# Patient Record
Sex: FEMALE | Race: WHITE | Employment: UNEMPLOYED | URBAN - METROPOLITAN AREA
[De-identification: names, ages, dates, MRNs, and addresses within clinical notes are randomized per-mention and may not be internally consistent; named-entity substitution may affect disease eponyms.]

---

## 2017-01-30 RX ORDER — SPIRONOLACTONE 100 MG/1
TABLET, FILM COATED ORAL
Qty: 90 TABLET | Refills: 1 | Status: SHIPPED | OUTPATIENT
Start: 2017-01-30 | End: 2017-12-13 | Stop reason: SDUPTHER

## 2018-01-26 ENCOUNTER — OFFICE VISIT (OUTPATIENT)
Dept: FAMILY MEDICINE CLINIC | Age: 43
End: 2018-01-26

## 2018-01-26 VITALS
DIASTOLIC BLOOD PRESSURE: 76 MMHG | BODY MASS INDEX: 21.89 KG/M2 | RESPIRATION RATE: 14 BRPM | HEART RATE: 64 BPM | SYSTOLIC BLOOD PRESSURE: 129 MMHG | TEMPERATURE: 96.9 F | HEIGHT: 65 IN | WEIGHT: 131.4 LBS | OXYGEN SATURATION: 98 %

## 2018-01-26 DIAGNOSIS — N93.8 DUB (DYSFUNCTIONAL UTERINE BLEEDING): ICD-10-CM

## 2018-01-26 DIAGNOSIS — R74.8 ELEVATED LIVER ENZYMES: ICD-10-CM

## 2018-01-26 DIAGNOSIS — Z00.00 WELL ADULT EXAM: Primary | ICD-10-CM

## 2018-01-26 PROCEDURE — 99396 PREV VISIT EST AGE 40-64: CPT | Performed by: FAMILY MEDICINE

## 2018-01-26 RX ORDER — NORETHINDRONE ACETATE AND ETHINYL ESTRADIOL 1.5-30(21)
1 KIT ORAL DAILY
Qty: 1 PACKET | Refills: 12 | Status: SHIPPED | OUTPATIENT
Start: 2018-01-26 | End: 2018-02-15 | Stop reason: SDUPTHER

## 2018-01-26 RX ORDER — NAPROXEN 500 MG/1
TABLET ORAL
Refills: 0 | COMMUNITY
Start: 2017-12-12 | End: 2018-02-22

## 2018-01-26 RX ORDER — FLUTICASONE PROPIONATE 110 UG/1
AEROSOL, METERED RESPIRATORY (INHALATION)
Status: ON HOLD | COMMUNITY
End: 2019-11-01 | Stop reason: CLARIF

## 2018-01-26 RX ORDER — SPIRONOLACTONE 100 MG/1
TABLET, FILM COATED ORAL
Qty: 90 TABLET | Refills: 3 | Status: SHIPPED | OUTPATIENT
Start: 2018-01-26 | End: 2019-01-16 | Stop reason: SDUPTHER

## 2018-01-26 ASSESSMENT — PATIENT HEALTH QUESTIONNAIRE - PHQ9
SUM OF ALL RESPONSES TO PHQ9 QUESTIONS 1 & 2: 0
2. FEELING DOWN, DEPRESSED OR HOPELESS: 0
1. LITTLE INTEREST OR PLEASURE IN DOING THINGS: 0
SUM OF ALL RESPONSES TO PHQ QUESTIONS 1-9: 0

## 2018-01-26 ASSESSMENT — ENCOUNTER SYMPTOMS
WHEEZING: 0
COUGH: 0
SHORTNESS OF BREATH: 0

## 2018-01-26 NOTE — PROGRESS NOTES
Subjective:      Patient ID: Nona Friend is a 38 y.o. female. NELIA Prado is here for her annual wellness exam.    Liver tests were mildly elevated with labs. She drinks alcohol rarely. Does not take Tylenol. Was taking Naproxen when labs were done. 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 Diet: eats well  Exercise: runs, weights. Sleeps well most nights. PAP normal per Dr. Gonzalez Labor Date Due    HIV screen  08/04/1988    Pneumococcal med risk (1 of 1 - PPSV23) 08/04/1992    Flu vaccine (1) 09/01/2017    Potassium monitoring  01/12/2019    Creatinine monitoring  01/12/2019    Cervical cancer screen  09/10/2020    Lipid screen  01/12/2023    DTaP/Tdap/Td vaccine (2 - Td) 08/06/2024     Social History   Substance Use Topics    Smoking status: Never Smoker    Smokeless tobacco: Never Used    Alcohol use Not on file        Family History   Problem Relation Age of Onset    Hypertension Mother     Hypertension Father     Coronary Art Dis Maternal Grandmother 72    Coronary Art Dis Maternal Uncle       Outpatient Prescriptions Marked as Taking for the 1/26/18 encounter (Office Visit) with Garett Elmore MD   Medication Sig Dispense Refill    fluticasone (FLOVENT HFA) 110 MCG/ACT inhaler Inhale into the lungs      spironolactone (ALDACTONE) 100 MG tablet TAKE 1 TABLET DAILY 30 tablet 0    fluticasone-salmeterol (ADVAIR DISKUS) 250-50 MCG/DOSE AEPB Inhale 1 puff into the lungs every 12 hours.  3 Inhaler 3        Allergies   Allergen Reactions    Dexamethasone Acetate Other (See Comments)     Makes pt have woozy head spells and HAs    Venlafaxine Other (See Comments)     Fatigue    Zoloft Other (See Comments)      Patient Active Problem List   Diagnosis    Acne    Asthma    Allergic rhinitis    PMDD (premenstrual dysphoric disorder)    Acne rosacea    b  Past Medical History:   Diagnosis Date    Asthma      Past

## 2018-02-15 DIAGNOSIS — N93.8 DUB (DYSFUNCTIONAL UTERINE BLEEDING): ICD-10-CM

## 2018-02-15 RX ORDER — NORETHINDRONE ACETATE AND ETHINYL ESTRADIOL 1.5-30(21)
1 KIT ORAL DAILY
Qty: 1 PACKET | Refills: 12 | Status: SHIPPED | OUTPATIENT
Start: 2018-02-15 | End: 2018-02-15 | Stop reason: SDUPTHER

## 2018-02-15 RX ORDER — NORETHINDRONE ACETATE AND ETHINYL ESTRADIOL 1.5-30(21)
1 KIT ORAL DAILY
Qty: 3 PACKET | Refills: 3 | Status: SHIPPED | OUTPATIENT
Start: 2018-02-15 | End: 2019-07-18 | Stop reason: SDUPTHER

## 2018-02-22 ENCOUNTER — OFFICE VISIT (OUTPATIENT)
Dept: FAMILY MEDICINE CLINIC | Age: 43
End: 2018-02-22

## 2018-02-22 VITALS
TEMPERATURE: 97.4 F | BODY MASS INDEX: 21.13 KG/M2 | SYSTOLIC BLOOD PRESSURE: 110 MMHG | OXYGEN SATURATION: 98 % | WEIGHT: 127 LBS | RESPIRATION RATE: 14 BRPM | DIASTOLIC BLOOD PRESSURE: 84 MMHG | HEART RATE: 67 BPM

## 2018-02-22 DIAGNOSIS — J20.9 BRONCHITIS WITH BRONCHOSPASM: Primary | ICD-10-CM

## 2018-02-22 PROCEDURE — 99213 OFFICE O/P EST LOW 20 MIN: CPT | Performed by: FAMILY MEDICINE

## 2018-02-22 RX ORDER — AZITHROMYCIN 250 MG/1
TABLET, FILM COATED ORAL
Qty: 1 PACKET | Refills: 0 | Status: SHIPPED | OUTPATIENT
Start: 2018-02-22 | End: 2018-03-04

## 2018-02-22 RX ORDER — AZITHROMYCIN 250 MG/1
TABLET, FILM COATED ORAL
Qty: 1 PACKET | Refills: 0 | Status: SHIPPED | OUTPATIENT
Start: 2018-02-22 | End: 2018-02-22 | Stop reason: SDUPTHER

## 2018-02-25 ENCOUNTER — PATIENT MESSAGE (OUTPATIENT)
Dept: FAMILY MEDICINE CLINIC | Age: 43
End: 2018-02-25

## 2018-02-26 RX ORDER — FLUCONAZOLE 150 MG/1
150 TABLET ORAL ONCE
Qty: 1 TABLET | Refills: 0 | Status: SHIPPED | OUTPATIENT
Start: 2018-02-26 | End: 2018-02-26

## 2018-03-02 ENCOUNTER — PATIENT MESSAGE (OUTPATIENT)
Dept: FAMILY MEDICINE CLINIC | Age: 43
End: 2018-03-02

## 2018-06-08 ENCOUNTER — NURSE ONLY (OUTPATIENT)
Dept: FAMILY MEDICINE CLINIC | Age: 43
End: 2018-06-08

## 2018-06-08 DIAGNOSIS — Z23 NEED FOR HEPATITIS A IMMUNIZATION: Primary | ICD-10-CM

## 2018-06-08 PROCEDURE — 90471 IMMUNIZATION ADMIN: CPT | Performed by: FAMILY MEDICINE

## 2018-06-08 PROCEDURE — 90632 HEPA VACCINE ADULT IM: CPT | Performed by: FAMILY MEDICINE

## 2019-01-16 ENCOUNTER — TELEPHONE (OUTPATIENT)
Dept: FAMILY MEDICINE CLINIC | Age: 44
End: 2019-01-16

## 2019-01-16 DIAGNOSIS — Z00.00 WELL ADULT EXAM: ICD-10-CM

## 2019-01-16 DIAGNOSIS — L71.9 ACNE ROSACEA: Primary | ICD-10-CM

## 2019-01-16 RX ORDER — SPIRONOLACTONE 100 MG/1
TABLET, FILM COATED ORAL
Qty: 90 TABLET | Refills: 0 | Status: SHIPPED | OUTPATIENT
Start: 2019-01-16 | End: 2019-02-11 | Stop reason: SDUPTHER

## 2019-02-05 DIAGNOSIS — Z00.00 WELL ADULT EXAM: ICD-10-CM

## 2019-02-05 LAB
A/G RATIO: 1.8 (ref 1.1–2.2)
ALBUMIN SERPL-MCNC: 4.2 G/DL (ref 3.4–5)
ALP BLD-CCNC: 55 U/L (ref 40–129)
ALT SERPL-CCNC: 16 U/L (ref 10–40)
ANION GAP SERPL CALCULATED.3IONS-SCNC: 13 MMOL/L (ref 3–16)
AST SERPL-CCNC: 22 U/L (ref 15–37)
BILIRUB SERPL-MCNC: 0.6 MG/DL (ref 0–1)
BUN BLDV-MCNC: 10 MG/DL (ref 7–20)
CALCIUM SERPL-MCNC: 9.4 MG/DL (ref 8.3–10.6)
CHLORIDE BLD-SCNC: 103 MMOL/L (ref 99–110)
CHOLESTEROL, TOTAL: 157 MG/DL (ref 0–199)
CO2: 25 MMOL/L (ref 21–32)
CREAT SERPL-MCNC: 0.6 MG/DL (ref 0.6–1.1)
GFR AFRICAN AMERICAN: >60
GFR NON-AFRICAN AMERICAN: >60
GLOBULIN: 2.3 G/DL
GLUCOSE BLD-MCNC: 93 MG/DL (ref 70–99)
HDLC SERPL-MCNC: 67 MG/DL (ref 40–60)
LDL CHOLESTEROL CALCULATED: 80 MG/DL
POTASSIUM SERPL-SCNC: 4.1 MMOL/L (ref 3.5–5.1)
SODIUM BLD-SCNC: 141 MMOL/L (ref 136–145)
TOTAL PROTEIN: 6.5 G/DL (ref 6.4–8.2)
TRIGL SERPL-MCNC: 50 MG/DL (ref 0–150)
TSH REFLEX: 1.15 UIU/ML (ref 0.27–4.2)
VLDLC SERPL CALC-MCNC: 10 MG/DL

## 2019-02-11 ENCOUNTER — OFFICE VISIT (OUTPATIENT)
Dept: FAMILY MEDICINE CLINIC | Age: 44
End: 2019-02-11
Payer: COMMERCIAL

## 2019-02-11 VITALS
HEIGHT: 64 IN | BODY MASS INDEX: 21.89 KG/M2 | SYSTOLIC BLOOD PRESSURE: 95 MMHG | DIASTOLIC BLOOD PRESSURE: 56 MMHG | OXYGEN SATURATION: 99 % | HEART RATE: 60 BPM | TEMPERATURE: 96.9 F | RESPIRATION RATE: 16 BRPM | WEIGHT: 128.2 LBS

## 2019-02-11 DIAGNOSIS — L82.1 SEBORRHEIC KERATOSES: ICD-10-CM

## 2019-02-11 DIAGNOSIS — J45.30 MILD PERSISTENT ASTHMA WITHOUT COMPLICATION: ICD-10-CM

## 2019-02-11 DIAGNOSIS — Z00.00 WELL ADULT EXAM: Primary | ICD-10-CM

## 2019-02-11 DIAGNOSIS — Z23 NEED FOR PNEUMOCOCCAL VACCINATION: ICD-10-CM

## 2019-02-11 DIAGNOSIS — L71.9 ACNE ROSACEA: ICD-10-CM

## 2019-02-11 DIAGNOSIS — Z23 NEED FOR HEPATITIS A VACCINATION: ICD-10-CM

## 2019-02-11 PROCEDURE — 90732 PPSV23 VACC 2 YRS+ SUBQ/IM: CPT | Performed by: FAMILY MEDICINE

## 2019-02-11 PROCEDURE — 99396 PREV VISIT EST AGE 40-64: CPT | Performed by: FAMILY MEDICINE

## 2019-02-11 PROCEDURE — 90632 HEPA VACCINE ADULT IM: CPT | Performed by: FAMILY MEDICINE

## 2019-02-11 PROCEDURE — 90471 IMMUNIZATION ADMIN: CPT | Performed by: FAMILY MEDICINE

## 2019-02-11 PROCEDURE — 90472 IMMUNIZATION ADMIN EACH ADD: CPT | Performed by: FAMILY MEDICINE

## 2019-02-11 RX ORDER — SPIRONOLACTONE 100 MG/1
TABLET, FILM COATED ORAL
Qty: 90 TABLET | Refills: 3 | Status: ON HOLD | OUTPATIENT
Start: 2019-02-11 | End: 2019-11-01 | Stop reason: ALTCHOICE

## 2019-02-11 ASSESSMENT — ENCOUNTER SYMPTOMS
DIARRHEA: 0
SORE THROAT: 0
ABDOMINAL PAIN: 0
BLOOD IN STOOL: 0
CHOKING: 0
CHEST TIGHTNESS: 0
VOICE CHANGE: 0
ANAL BLEEDING: 0
TROUBLE SWALLOWING: 0
NAUSEA: 0
WHEEZING: 0
SHORTNESS OF BREATH: 0
CONSTIPATION: 0

## 2019-09-09 ENCOUNTER — HOSPITAL ENCOUNTER (OUTPATIENT)
Dept: GENERAL RADIOLOGY | Age: 44
Discharge: HOME | End: 2019-09-09
Payer: COMMERCIAL

## 2019-09-09 DIAGNOSIS — R13.10 PROBLEMS WITH SWALLOWING AND MASTICATION: ICD-10-CM

## 2019-09-09 PROCEDURE — 74220 X-RAY XM ESOPHAGUS 1CNTRST: CPT

## 2019-11-01 ENCOUNTER — ANESTHESIA (OUTPATIENT)
Dept: ENDOSCOPY | Age: 44
End: 2019-11-01
Payer: COMMERCIAL

## 2019-11-01 ENCOUNTER — APPOINTMENT (OUTPATIENT)
Dept: GENERAL RADIOLOGY | Age: 44
End: 2019-11-01
Attending: INTERNAL MEDICINE
Payer: COMMERCIAL

## 2019-11-01 ENCOUNTER — HOSPITAL ENCOUNTER (OUTPATIENT)
Age: 44
Setting detail: OUTPATIENT SURGERY
Discharge: HOME | End: 2019-11-01
Attending: INTERNAL MEDICINE | Admitting: INTERNAL MEDICINE
Payer: COMMERCIAL

## 2019-11-01 ENCOUNTER — ANESTHESIA EVENT (OUTPATIENT)
Dept: ENDOSCOPY | Age: 44
End: 2019-11-01
Payer: COMMERCIAL

## 2019-11-01 VITALS
WEIGHT: 130 LBS | RESPIRATION RATE: 15 BRPM | SYSTOLIC BLOOD PRESSURE: 114 MMHG | HEIGHT: 65 IN | DIASTOLIC BLOOD PRESSURE: 72 MMHG | OXYGEN SATURATION: 100 % | HEART RATE: 52 BPM | TEMPERATURE: 98.1 F | BODY MASS INDEX: 21.66 KG/M2

## 2019-11-01 VITALS — SYSTOLIC BLOOD PRESSURE: 139 MMHG | OXYGEN SATURATION: 99 % | DIASTOLIC BLOOD PRESSURE: 75 MMHG

## 2019-11-01 LAB — PREGNANCY, URINE: NEGATIVE

## 2019-11-01 PROCEDURE — 3609012400 HC EGD TRANSORAL BIOPSY SINGLE/MULTIPLE: Performed by: INTERNAL MEDICINE

## 2019-11-01 PROCEDURE — 7100000011 HC PHASE II RECOVERY - ADDTL 15 MIN: Performed by: INTERNAL MEDICINE

## 2019-11-01 PROCEDURE — 7100000010 HC PHASE II RECOVERY - FIRST 15 MIN: Performed by: INTERNAL MEDICINE

## 2019-11-01 PROCEDURE — 2709999900 HC NON-CHARGEABLE SUPPLY: Performed by: INTERNAL MEDICINE

## 2019-11-01 PROCEDURE — 3609017700 HC EGD DILATION GASTRIC/DUODENAL STRICTURE: Performed by: INTERNAL MEDICINE

## 2019-11-01 PROCEDURE — 2500000003 HC RX 250 WO HCPCS: Performed by: STUDENT IN AN ORGANIZED HEALTH CARE EDUCATION/TRAINING PROGRAM

## 2019-11-01 PROCEDURE — 71045 X-RAY EXAM CHEST 1 VIEW: CPT

## 2019-11-01 PROCEDURE — 3700000000 HC ANESTHESIA ATTENDED CARE: Performed by: INTERNAL MEDICINE

## 2019-11-01 PROCEDURE — 2580000003 HC RX 258: Performed by: STUDENT IN AN ORGANIZED HEALTH CARE EDUCATION/TRAINING PROGRAM

## 2019-11-01 PROCEDURE — 3700000001 HC ADD 15 MINUTES (ANESTHESIA): Performed by: INTERNAL MEDICINE

## 2019-11-01 PROCEDURE — 6360000002 HC RX W HCPCS: Performed by: STUDENT IN AN ORGANIZED HEALTH CARE EDUCATION/TRAINING PROGRAM

## 2019-11-01 PROCEDURE — 84703 CHORIONIC GONADOTROPIN ASSAY: CPT

## 2019-11-01 PROCEDURE — 88305 TISSUE EXAM BY PATHOLOGIST: CPT

## 2019-11-01 PROCEDURE — 3209999900 FLUORO FOR SURGICAL PROCEDURES

## 2019-11-01 RX ORDER — LIDOCAINE HYDROCHLORIDE 20 MG/ML
INJECTION, SOLUTION INFILTRATION; PERINEURAL PRN
Status: DISCONTINUED | OUTPATIENT
Start: 2019-11-01 | End: 2019-11-01 | Stop reason: SDUPTHER

## 2019-11-01 RX ORDER — SODIUM CHLORIDE, SODIUM LACTATE, POTASSIUM CHLORIDE, CALCIUM CHLORIDE 600; 310; 30; 20 MG/100ML; MG/100ML; MG/100ML; MG/100ML
INJECTION, SOLUTION INTRAVENOUS CONTINUOUS PRN
Status: DISCONTINUED | OUTPATIENT
Start: 2019-11-01 | End: 2019-11-01 | Stop reason: SDUPTHER

## 2019-11-01 RX ORDER — PROPOFOL 10 MG/ML
INJECTION, EMULSION INTRAVENOUS PRN
Status: DISCONTINUED | OUTPATIENT
Start: 2019-11-01 | End: 2019-11-01 | Stop reason: SDUPTHER

## 2019-11-01 RX ADMIN — SODIUM CHLORIDE, SODIUM LACTATE, POTASSIUM CHLORIDE, AND CALCIUM CHLORIDE: 600; 310; 30; 20 INJECTION, SOLUTION INTRAVENOUS at 11:02

## 2019-11-01 RX ADMIN — PROPOFOL 100 MG: 10 INJECTION, EMULSION INTRAVENOUS at 11:30

## 2019-11-01 RX ADMIN — PROPOFOL 50 MG: 10 INJECTION, EMULSION INTRAVENOUS at 11:37

## 2019-11-01 RX ADMIN — PROPOFOL 100 MG: 10 INJECTION, EMULSION INTRAVENOUS at 11:35

## 2019-11-01 RX ADMIN — PROPOFOL 50 MG: 10 INJECTION, EMULSION INTRAVENOUS at 11:25

## 2019-11-01 RX ADMIN — PROPOFOL 100 MG: 10 INJECTION, EMULSION INTRAVENOUS at 11:32

## 2019-11-01 RX ADMIN — PROPOFOL 100 MG: 10 INJECTION, EMULSION INTRAVENOUS at 11:23

## 2019-11-01 RX ADMIN — PROPOFOL 50 MG: 10 INJECTION, EMULSION INTRAVENOUS at 11:39

## 2019-11-01 RX ADMIN — LIDOCAINE HYDROCHLORIDE 100 MG: 20 INJECTION, SOLUTION INFILTRATION; PERINEURAL at 11:22

## 2019-11-01 RX ADMIN — PROPOFOL 50 MG: 10 INJECTION, EMULSION INTRAVENOUS at 11:27

## 2019-11-01 ASSESSMENT — PAIN SCALES - WONG BAKER
WONGBAKER_NUMERICALRESPONSE: 0

## 2019-11-01 ASSESSMENT — PULMONARY FUNCTION TESTS
PIF_VALUE: 0

## 2019-11-01 ASSESSMENT — PAIN - FUNCTIONAL ASSESSMENT: PAIN_FUNCTIONAL_ASSESSMENT: 0-10

## 2019-11-01 ASSESSMENT — PAIN SCALES - GENERAL: PAINLEVEL_OUTOF10: 5

## 2019-11-09 ENCOUNTER — HOSPITAL ENCOUNTER (OUTPATIENT)
Dept: CT IMAGING | Age: 44
Discharge: HOME | End: 2019-11-09
Payer: COMMERCIAL

## 2019-11-09 DIAGNOSIS — M54.2 NECK PAIN: ICD-10-CM

## 2019-11-09 DIAGNOSIS — M54.2 NECK PAIN: Primary | ICD-10-CM

## 2019-11-09 PROCEDURE — 6360000004 HC RX CONTRAST MEDICATION: Performed by: INTERNAL MEDICINE

## 2019-11-09 PROCEDURE — 70490 CT SOFT TISSUE NECK W/O DYE: CPT

## 2019-11-09 PROCEDURE — 71275 CT ANGIOGRAPHY CHEST: CPT

## 2019-11-09 RX ADMIN — IOPAMIDOL 80 ML: 755 INJECTION, SOLUTION INTRAVENOUS at 11:49

## 2019-12-05 ENCOUNTER — ANESTHESIA EVENT (OUTPATIENT)
Dept: ENDOSCOPY | Age: 44
End: 2019-12-05
Payer: COMMERCIAL

## 2019-12-05 RX ORDER — SODIUM CHLORIDE, SODIUM LACTATE, POTASSIUM CHLORIDE, CALCIUM CHLORIDE 600; 310; 30; 20 MG/100ML; MG/100ML; MG/100ML; MG/100ML
INJECTION, SOLUTION INTRAVENOUS CONTINUOUS
Status: CANCELLED | OUTPATIENT
Start: 2019-12-05

## 2019-12-06 ENCOUNTER — APPOINTMENT (OUTPATIENT)
Dept: GENERAL RADIOLOGY | Age: 44
End: 2019-12-06
Attending: INTERNAL MEDICINE
Payer: COMMERCIAL

## 2019-12-06 ENCOUNTER — HOSPITAL ENCOUNTER (OUTPATIENT)
Age: 44
Setting detail: OUTPATIENT SURGERY
Discharge: HOME | End: 2019-12-06
Attending: INTERNAL MEDICINE | Admitting: INTERNAL MEDICINE
Payer: COMMERCIAL

## 2019-12-06 ENCOUNTER — ANESTHESIA (OUTPATIENT)
Dept: ENDOSCOPY | Age: 44
End: 2019-12-06
Payer: COMMERCIAL

## 2019-12-06 VITALS
WEIGHT: 130 LBS | OXYGEN SATURATION: 98 % | DIASTOLIC BLOOD PRESSURE: 70 MMHG | HEIGHT: 65 IN | TEMPERATURE: 97.2 F | BODY MASS INDEX: 21.66 KG/M2 | RESPIRATION RATE: 16 BRPM | SYSTOLIC BLOOD PRESSURE: 126 MMHG | HEART RATE: 70 BPM

## 2019-12-06 VITALS — SYSTOLIC BLOOD PRESSURE: 158 MMHG | DIASTOLIC BLOOD PRESSURE: 94 MMHG | OXYGEN SATURATION: 98 %

## 2019-12-06 LAB — PREGNANCY, URINE: NEGATIVE

## 2019-12-06 PROCEDURE — 2500000003 HC RX 250 WO HCPCS: Performed by: STUDENT IN AN ORGANIZED HEALTH CARE EDUCATION/TRAINING PROGRAM

## 2019-12-06 PROCEDURE — 7100000010 HC PHASE II RECOVERY - FIRST 15 MIN: Performed by: INTERNAL MEDICINE

## 2019-12-06 PROCEDURE — 84703 CHORIONIC GONADOTROPIN ASSAY: CPT

## 2019-12-06 PROCEDURE — 3700000000 HC ANESTHESIA ATTENDED CARE: Performed by: INTERNAL MEDICINE

## 2019-12-06 PROCEDURE — 3609017700 HC EGD DILATION GASTRIC/DUODENAL STRICTURE: Performed by: INTERNAL MEDICINE

## 2019-12-06 PROCEDURE — 71045 X-RAY EXAM CHEST 1 VIEW: CPT

## 2019-12-06 PROCEDURE — 88305 TISSUE EXAM BY PATHOLOGIST: CPT

## 2019-12-06 PROCEDURE — 3209999900 FLUORO FOR SURGICAL PROCEDURES

## 2019-12-06 PROCEDURE — 2580000003 HC RX 258: Performed by: STUDENT IN AN ORGANIZED HEALTH CARE EDUCATION/TRAINING PROGRAM

## 2019-12-06 PROCEDURE — 3700000001 HC ADD 15 MINUTES (ANESTHESIA): Performed by: INTERNAL MEDICINE

## 2019-12-06 PROCEDURE — 6360000002 HC RX W HCPCS: Performed by: STUDENT IN AN ORGANIZED HEALTH CARE EDUCATION/TRAINING PROGRAM

## 2019-12-06 PROCEDURE — 7100000011 HC PHASE II RECOVERY - ADDTL 15 MIN: Performed by: INTERNAL MEDICINE

## 2019-12-06 PROCEDURE — 2709999900 HC NON-CHARGEABLE SUPPLY: Performed by: INTERNAL MEDICINE

## 2019-12-06 PROCEDURE — C1726 CATH, BAL DIL, NON-VASCULAR: HCPCS | Performed by: INTERNAL MEDICINE

## 2019-12-06 PROCEDURE — 3609012400 HC EGD TRANSORAL BIOPSY SINGLE/MULTIPLE: Performed by: INTERNAL MEDICINE

## 2019-12-06 RX ORDER — MIDAZOLAM HYDROCHLORIDE 1 MG/ML
INJECTION INTRAMUSCULAR; INTRAVENOUS PRN
Status: DISCONTINUED | OUTPATIENT
Start: 2019-12-06 | End: 2019-12-06 | Stop reason: SDUPTHER

## 2019-12-06 RX ORDER — HYDRALAZINE HYDROCHLORIDE 20 MG/ML
INJECTION INTRAMUSCULAR; INTRAVENOUS PRN
Status: DISCONTINUED | OUTPATIENT
Start: 2019-12-06 | End: 2019-12-06 | Stop reason: SDUPTHER

## 2019-12-06 RX ORDER — SODIUM CHLORIDE, SODIUM LACTATE, POTASSIUM CHLORIDE, CALCIUM CHLORIDE 600; 310; 30; 20 MG/100ML; MG/100ML; MG/100ML; MG/100ML
INJECTION, SOLUTION INTRAVENOUS CONTINUOUS PRN
Status: DISCONTINUED | OUTPATIENT
Start: 2019-12-06 | End: 2019-12-06 | Stop reason: SDUPTHER

## 2019-12-06 RX ORDER — PROPOFOL 10 MG/ML
INJECTION, EMULSION INTRAVENOUS PRN
Status: DISCONTINUED | OUTPATIENT
Start: 2019-12-06 | End: 2019-12-06 | Stop reason: SDUPTHER

## 2019-12-06 RX ORDER — FENTANYL CITRATE 50 UG/ML
INJECTION, SOLUTION INTRAMUSCULAR; INTRAVENOUS PRN
Status: DISCONTINUED | OUTPATIENT
Start: 2019-12-06 | End: 2019-12-06 | Stop reason: SDUPTHER

## 2019-12-06 RX ORDER — LIDOCAINE HYDROCHLORIDE 20 MG/ML
INJECTION, SOLUTION INFILTRATION; PERINEURAL PRN
Status: DISCONTINUED | OUTPATIENT
Start: 2019-12-06 | End: 2019-12-06 | Stop reason: SDUPTHER

## 2019-12-06 RX ADMIN — SODIUM CHLORIDE, SODIUM LACTATE, POTASSIUM CHLORIDE, AND CALCIUM CHLORIDE: 600; 310; 30; 20 INJECTION, SOLUTION INTRAVENOUS at 12:25

## 2019-12-06 RX ADMIN — HYDRALAZINE HYDROCHLORIDE 10 MG: 20 INJECTION INTRAMUSCULAR; INTRAVENOUS at 12:43

## 2019-12-06 RX ADMIN — PROPOFOL 50 MG: 10 INJECTION, EMULSION INTRAVENOUS at 12:56

## 2019-12-06 RX ADMIN — PROPOFOL 50 MG: 10 INJECTION, EMULSION INTRAVENOUS at 12:50

## 2019-12-06 RX ADMIN — LIDOCAINE HYDROCHLORIDE 50 MG: 20 INJECTION, SOLUTION INFILTRATION; PERINEURAL at 12:42

## 2019-12-06 RX ADMIN — PROPOFOL 50 MG: 10 INJECTION, EMULSION INTRAVENOUS at 13:02

## 2019-12-06 RX ADMIN — PROPOFOL 50 MG: 10 INJECTION, EMULSION INTRAVENOUS at 13:04

## 2019-12-06 RX ADMIN — FENTANYL CITRATE 100 MCG: 50 INJECTION INTRAMUSCULAR; INTRAVENOUS at 12:36

## 2019-12-06 RX ADMIN — MIDAZOLAM HYDROCHLORIDE 2 MG: 2 INJECTION, SOLUTION INTRAMUSCULAR; INTRAVENOUS at 12:41

## 2019-12-06 RX ADMIN — PROPOFOL 100 MG: 10 INJECTION, EMULSION INTRAVENOUS at 12:45

## 2019-12-06 RX ADMIN — PROPOFOL 50 MG: 10 INJECTION, EMULSION INTRAVENOUS at 12:54

## 2019-12-06 RX ADMIN — PROPOFOL 50 MG: 10 INJECTION, EMULSION INTRAVENOUS at 12:58

## 2019-12-06 RX ADMIN — PROPOFOL 50 MG: 10 INJECTION, EMULSION INTRAVENOUS at 12:52

## 2019-12-06 RX ADMIN — PROPOFOL 50 MG: 10 INJECTION, EMULSION INTRAVENOUS at 12:48

## 2019-12-06 RX ADMIN — PROPOFOL 50 MG: 10 INJECTION, EMULSION INTRAVENOUS at 13:06

## 2019-12-06 RX ADMIN — PROPOFOL 50 MG: 10 INJECTION, EMULSION INTRAVENOUS at 13:00

## 2019-12-06 ASSESSMENT — PULMONARY FUNCTION TESTS
PIF_VALUE: 0

## 2019-12-06 ASSESSMENT — PAIN DESCRIPTION - DESCRIPTORS: DESCRIPTORS: ACHING

## 2019-12-06 ASSESSMENT — PAIN SCALES - WONG BAKER
WONGBAKER_NUMERICALRESPONSE: 2
WONGBAKER_NUMERICALRESPONSE: 0

## 2019-12-06 ASSESSMENT — PAIN SCALES - GENERAL: PAINLEVEL_OUTOF10: 4

## 2019-12-06 ASSESSMENT — PAIN DESCRIPTION - LOCATION
LOCATION: THROAT
LOCATION: THROAT

## 2019-12-06 ASSESSMENT — PAIN DESCRIPTION - PAIN TYPE: TYPE: ACUTE PAIN

## 2019-12-09 PROBLEM — K20.0 EOSINOPHILIC ESOPHAGITIS: Status: ACTIVE | Noted: 2019-12-09

## 2019-12-16 PROBLEM — Q39.4 ESOPHAGEAL WEB: Status: ACTIVE | Noted: 2019-12-16

## 2020-01-29 ENCOUNTER — PATIENT MESSAGE (OUTPATIENT)
Dept: FAMILY MEDICINE CLINIC | Age: 45
End: 2020-01-29

## 2020-01-30 DIAGNOSIS — Z00.00 WELL ADULT EXAM: ICD-10-CM

## 2020-01-30 LAB
A/G RATIO: 1.8 (ref 1.1–2.2)
ALBUMIN SERPL-MCNC: 4.2 G/DL (ref 3.4–5)
ALP BLD-CCNC: 55 U/L (ref 40–129)
ALT SERPL-CCNC: 24 U/L (ref 10–40)
ANION GAP SERPL CALCULATED.3IONS-SCNC: 12 MMOL/L (ref 3–16)
AST SERPL-CCNC: 28 U/L (ref 15–37)
BASOPHILS ABSOLUTE: 0 K/UL (ref 0–0.2)
BASOPHILS RELATIVE PERCENT: 1.3 %
BILIRUB SERPL-MCNC: 0.5 MG/DL (ref 0–1)
BUN BLDV-MCNC: 9 MG/DL (ref 7–20)
CALCIUM SERPL-MCNC: 9.3 MG/DL (ref 8.3–10.6)
CHLORIDE BLD-SCNC: 105 MMOL/L (ref 99–110)
CHOLESTEROL, TOTAL: 183 MG/DL (ref 0–199)
CO2: 26 MMOL/L (ref 21–32)
CREAT SERPL-MCNC: 0.6 MG/DL (ref 0.6–1.1)
EOSINOPHILS ABSOLUTE: 0.3 K/UL (ref 0–0.6)
EOSINOPHILS RELATIVE PERCENT: 7.2 %
GFR AFRICAN AMERICAN: >60
GFR NON-AFRICAN AMERICAN: >60
GLOBULIN: 2.4 G/DL
GLUCOSE BLD-MCNC: 96 MG/DL (ref 70–99)
HCT VFR BLD CALC: 40.5 % (ref 36–48)
HDLC SERPL-MCNC: 82 MG/DL (ref 40–60)
HEMOGLOBIN: 13.5 G/DL (ref 12–16)
LDL CHOLESTEROL CALCULATED: 91 MG/DL
LYMPHOCYTES ABSOLUTE: 0.8 K/UL (ref 1–5.1)
LYMPHOCYTES RELATIVE PERCENT: 21.4 %
MCH RBC QN AUTO: 30.1 PG (ref 26–34)
MCHC RBC AUTO-ENTMCNC: 33.3 G/DL (ref 31–36)
MCV RBC AUTO: 90.2 FL (ref 80–100)
MONOCYTES ABSOLUTE: 0.5 K/UL (ref 0–1.3)
MONOCYTES RELATIVE PERCENT: 12.7 %
NEUTROPHILS ABSOLUTE: 2.2 K/UL (ref 1.7–7.7)
NEUTROPHILS RELATIVE PERCENT: 57.4 %
PDW BLD-RTO: 13.3 % (ref 12.4–15.4)
PLATELET # BLD: 217 K/UL (ref 135–450)
PMV BLD AUTO: 8.1 FL (ref 5–10.5)
POTASSIUM SERPL-SCNC: 4.3 MMOL/L (ref 3.5–5.1)
RBC # BLD: 4.49 M/UL (ref 4–5.2)
SODIUM BLD-SCNC: 143 MMOL/L (ref 136–145)
TOTAL PROTEIN: 6.6 G/DL (ref 6.4–8.2)
TRIGL SERPL-MCNC: 48 MG/DL (ref 0–150)
TSH REFLEX: 0.73 UIU/ML (ref 0.27–4.2)
VLDLC SERPL CALC-MCNC: 10 MG/DL
WBC # BLD: 3.8 K/UL (ref 4–11)

## 2020-02-04 ENCOUNTER — OFFICE VISIT (OUTPATIENT)
Dept: FAMILY MEDICINE CLINIC | Age: 45
End: 2020-02-04
Payer: COMMERCIAL

## 2020-02-04 VITALS
BODY MASS INDEX: 22.47 KG/M2 | SYSTOLIC BLOOD PRESSURE: 131 MMHG | RESPIRATION RATE: 12 BRPM | TEMPERATURE: 96.6 F | OXYGEN SATURATION: 100 % | WEIGHT: 135 LBS | DIASTOLIC BLOOD PRESSURE: 81 MMHG | HEART RATE: 60 BPM

## 2020-02-04 PROCEDURE — 99396 PREV VISIT EST AGE 40-64: CPT | Performed by: FAMILY MEDICINE

## 2020-02-04 RX ORDER — AMOXICILLIN 875 MG/1
875 TABLET, COATED ORAL 2 TIMES DAILY
COMMUNITY
Start: 2020-02-03 | End: 2021-02-08

## 2020-02-04 RX ORDER — OMEPRAZOLE 20 MG/1
20 CAPSULE, DELAYED RELEASE ORAL ONCE
COMMUNITY
Start: 2020-01-09 | End: 2022-02-10 | Stop reason: SDUPTHER

## 2020-02-04 ASSESSMENT — ENCOUNTER SYMPTOMS
WHEEZING: 0
CHOKING: 0
DIARRHEA: 0
SORE THROAT: 0
SHORTNESS OF BREATH: 0
ANAL BLEEDING: 0
BLOOD IN STOOL: 0
CONSTIPATION: 0
VOICE CHANGE: 0
TROUBLE SWALLOWING: 0
CHEST TIGHTNESS: 0
ABDOMINAL PAIN: 0
NAUSEA: 0

## 2020-02-06 LAB
HPV COMMENT: NORMAL
HPV TYPE 16: NOT DETECTED
HPV TYPE 18: NOT DETECTED
HPVOH (OTHER TYPES): NOT DETECTED

## 2020-02-12 ENCOUNTER — HOSPITAL ENCOUNTER (OUTPATIENT)
Dept: GENERAL RADIOLOGY | Age: 45
Discharge: HOME | End: 2020-02-12
Payer: COMMERCIAL

## 2020-02-12 ENCOUNTER — HOSPITAL ENCOUNTER (OUTPATIENT)
Age: 45
Discharge: HOME | End: 2020-02-12
Payer: COMMERCIAL

## 2020-02-12 PROCEDURE — 72040 X-RAY EXAM NECK SPINE 2-3 VW: CPT

## 2020-02-19 ENCOUNTER — OFFICE VISIT (OUTPATIENT)
Dept: FAMILY MEDICINE CLINIC | Age: 45
End: 2020-02-19
Payer: COMMERCIAL

## 2020-02-19 ENCOUNTER — PATIENT MESSAGE (OUTPATIENT)
Dept: FAMILY MEDICINE CLINIC | Age: 45
End: 2020-02-19

## 2020-02-19 VITALS
DIASTOLIC BLOOD PRESSURE: 75 MMHG | TEMPERATURE: 96.1 F | SYSTOLIC BLOOD PRESSURE: 129 MMHG | BODY MASS INDEX: 23.13 KG/M2 | HEART RATE: 62 BPM | OXYGEN SATURATION: 98 % | RESPIRATION RATE: 12 BRPM | WEIGHT: 139 LBS

## 2020-02-19 PROCEDURE — 99213 OFFICE O/P EST LOW 20 MIN: CPT | Performed by: FAMILY MEDICINE

## 2020-02-19 PROCEDURE — 87804 INFLUENZA ASSAY W/OPTIC: CPT | Performed by: FAMILY MEDICINE

## 2020-02-19 RX ORDER — PREDNISONE 20 MG/1
20 TABLET ORAL 2 TIMES DAILY
Qty: 8 TABLET | Refills: 0 | Status: SHIPPED | OUTPATIENT
Start: 2020-02-19 | End: 2020-02-23

## 2020-02-19 RX ORDER — SULFAMETHOXAZOLE AND TRIMETHOPRIM 800; 160 MG/1; MG/1
1 TABLET ORAL 2 TIMES DAILY
Qty: 20 TABLET | Refills: 0 | Status: SHIPPED | OUTPATIENT
Start: 2020-02-19 | End: 2020-02-29

## 2020-02-19 NOTE — PROGRESS NOTES
reviewed and questions answered. Call or return to clinic prn if these symptoms worsen or fail to improve as anticipated.

## 2020-03-02 ENCOUNTER — PATIENT MESSAGE (OUTPATIENT)
Dept: FAMILY MEDICINE CLINIC | Age: 45
End: 2020-03-02

## 2020-03-02 RX ORDER — FLUCONAZOLE 150 MG/1
150 TABLET ORAL ONCE
Qty: 1 TABLET | Refills: 0 | Status: SHIPPED | OUTPATIENT
Start: 2020-03-02 | End: 2020-03-02

## 2020-06-22 ENCOUNTER — PATIENT MESSAGE (OUTPATIENT)
Dept: FAMILY MEDICINE CLINIC | Age: 45
End: 2020-06-22

## 2020-10-27 ENCOUNTER — TELEPHONE (OUTPATIENT)
Dept: FAMILY MEDICINE CLINIC | Age: 45
End: 2020-10-27

## 2020-10-27 NOTE — TELEPHONE ENCOUNTER
I need to know how long she has had sxs for the order.   Tell her I will sign the order as soon as I have that information

## 2020-10-27 NOTE — TELEPHONE ENCOUNTER
Patient called to see if COVID test had been ordered. Please contact her when this has been ordered so [facility] can [facility]

## 2020-10-27 NOTE — TELEPHONE ENCOUNTER
Call Honey  at # [phone] once order has been faxed to [facility] at fax # [phone].

## 2020-10-27 NOTE — TELEPHONE ENCOUNTER
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 She denies fever, cough, SOB, no loss of taste or smell. Her employer will not let her come back to work without having a Covid 19 test.  [facility] will take too long to get the results back to her. She would like to know if Dr. Chloé Bonilla can send an order to [address] [place] [address] as soon as possible so she can be worked into their schedule today. Please advise. Thanks.           Lorena Schuler [phone]    Please fax order to [address] [place] [address] at [phone]

## 2020-10-29 ENCOUNTER — TELEPHONE (OUTPATIENT)
Dept: FAMILY MEDICINE CLINIC | Age: 45
End: 2020-10-29

## 2020-10-29 DIAGNOSIS — Z20.822 EXPOSURE TO COVID-19 VIRUS: ICD-10-CM

## 2020-10-29 NOTE — TELEPHONE ENCOUNTER
Pt calling for covid results.   Pt informed of scanned in results sent from [address] [facility] understanding

## 2020-11-19 ENCOUNTER — PATIENT MESSAGE (OUTPATIENT)
Dept: FAMILY MEDICINE CLINIC | Age: 45
End: 2020-11-19

## 2020-11-20 ENCOUNTER — PATIENT MESSAGE (OUTPATIENT)
Dept: FAMILY MEDICINE CLINIC | Age: 45
End: 2020-11-20

## 2020-11-20 LAB — SARS-COV-2: NEGATIVE

## 2020-11-23 DIAGNOSIS — Z20.822 SUSPECTED COVID-19 VIRUS INFECTION: ICD-10-CM

## 2021-01-28 ENCOUNTER — TELEPHONE (OUTPATIENT)
Dept: FAMILY MEDICINE CLINIC | Age: 46
End: 2021-01-28

## 2021-01-28 DIAGNOSIS — Z00.00 WELL ADULT EXAM: Primary | ICD-10-CM

## 2021-01-28 NOTE — TELEPHONE ENCOUNTER
Labs pending for review and approval  Please advise  Thank you    Mari Sweet   Fax number [phone]       ----- Message from Massimo Stroud sent at 1/28/2021  2:05 PM EST -----  Subject: Referral Request    QUESTIONS   Reason for referral request? Pt would like to schedule routine blood work   for awv. Pt would like everything to be sent over to Mari Sweet Fax number [phone]    Has the physician seen you for this condition before? No   Preferred Specialist (if applicable)? Do you already have an appointment scheduled? Yes  Select Scheduled Date? 2021-02-08  Select Scheduled Physician? Jennifer Jorge   Additional Information for Provider?   ---------------------------------------------------------------------------  --------------  Chen THORNE  What is the best way for the office to contact you? OK to leave message on   voicemail  Preferred Call Back Phone Number?  [phone]

## 2021-02-03 ENCOUNTER — PATIENT MESSAGE (OUTPATIENT)
Dept: FAMILY MEDICINE CLINIC | Age: 46
End: 2021-02-03

## 2021-02-03 NOTE — TELEPHONE ENCOUNTER
From: Rachel Mccann  To: Zeeshan Bello MD  Sent: 2/3/2021 7:24 AM EST  Subject: Non-Urgent Medical Question    I'd like to switch my appointment on Feb 8th to in person rather than tele-health.

## 2021-02-08 ENCOUNTER — OFFICE VISIT (OUTPATIENT)
Dept: FAMILY MEDICINE CLINIC | Age: 46
End: 2021-02-08
Payer: COMMERCIAL

## 2021-02-08 ENCOUNTER — TELEPHONE (OUTPATIENT)
Dept: FAMILY MEDICINE CLINIC | Age: 46
End: 2021-02-08

## 2021-02-08 VITALS
WEIGHT: 141 LBS | SYSTOLIC BLOOD PRESSURE: 132 MMHG | HEIGHT: 64 IN | RESPIRATION RATE: 12 BRPM | HEART RATE: 61 BPM | OXYGEN SATURATION: 99 % | BODY MASS INDEX: 24.07 KG/M2 | DIASTOLIC BLOOD PRESSURE: 86 MMHG | TEMPERATURE: 97.8 F

## 2021-02-08 DIAGNOSIS — L70.8 OTHER ACNE: ICD-10-CM

## 2021-02-08 DIAGNOSIS — J45.30 MILD PERSISTENT ASTHMA WITHOUT COMPLICATION: ICD-10-CM

## 2021-02-08 DIAGNOSIS — Z00.00 WELL ADULT EXAM: Primary | ICD-10-CM

## 2021-02-08 DIAGNOSIS — K20.0 EOSINOPHILIC ESOPHAGITIS: ICD-10-CM

## 2021-02-08 PROCEDURE — 99396 PREV VISIT EST AGE 40-64: CPT | Performed by: FAMILY MEDICINE

## 2021-02-08 RX ORDER — ERYTHROMYCIN 20 MG/G
GEL TOPICAL 2 TIMES DAILY
Qty: 60 G | Refills: 2 | Status: SHIPPED | OUTPATIENT
Start: 2021-02-08 | End: 2021-02-08 | Stop reason: SDUPTHER

## 2021-02-08 RX ORDER — ADAPALENE 0.1 G/100G
CREAM TOPICAL
Qty: 45 G | Refills: 3 | Status: SHIPPED | OUTPATIENT
Start: 2021-02-08 | End: 2021-03-10

## 2021-02-08 RX ORDER — ERYTHROMYCIN 20 MG/G
GEL TOPICAL 2 TIMES DAILY
Qty: 60 G | Refills: 2 | Status: SHIPPED | OUTPATIENT
Start: 2021-02-08 | End: 2022-02-18 | Stop reason: ALTCHOICE

## 2021-02-08 SDOH — ECONOMIC STABILITY: FOOD INSECURITY: WITHIN THE PAST 12 MONTHS, YOU WORRIED THAT YOUR FOOD WOULD RUN OUT BEFORE YOU GOT MONEY TO BUY MORE.: NEVER TRUE

## 2021-02-08 SDOH — ECONOMIC STABILITY: TRANSPORTATION INSECURITY
IN THE PAST 12 MONTHS, HAS LACK OF TRANSPORTATION KEPT YOU FROM MEETINGS, WORK, OR FROM GETTING THINGS NEEDED FOR DAILY LIVING?: NO

## 2021-02-08 ASSESSMENT — ENCOUNTER SYMPTOMS
WHEEZING: 0
TROUBLE SWALLOWING: 0
BACK PAIN: 0
BLOOD IN STOOL: 0
NAUSEA: 0
ABDOMINAL PAIN: 0
DIARRHEA: 0
SORE THROAT: 0
CHEST TIGHTNESS: 0
CONSTIPATION: 0
ANAL BLEEDING: 0
SHORTNESS OF BREATH: 0
CHOKING: 0
VOICE CHANGE: 0

## 2021-02-08 NOTE — TELEPHONE ENCOUNTER
[facility] pharmacy calling to get direction clarification; two sets of directions. Please advise. Thanks.     Medication  erythromycin with ethanol (EMGEL) 2 % gel [2885]  erythromycin with ethanol (EMGEL) 2 % gel [[id number]]     Order Details  Dose: -- Route: Topical Frequency: 2 TIMES DAILY   Dispense Quantity: 60 g Refills: 2          Sig: Apply topically 2 times daily Apply topically daily.         Start Date: 02/08/21 End Date: --   Written Date: 02/08/21 Expiration Date: 02/08/22       Diagnosis Association: Other acne (L70.8)   Providers    Authorizing Provider: Arin Enriquez MD NPI: [id number]   Ordering User:  Arin Enriquez MD        Pharmacy    [facility] #[id number] Harshal Cerda [address] [place] [address] [phone] - F [phone]   [address] [place] [id number]   Phone:  [phone]  Fax:  [phone]

## 2021-02-08 NOTE — PROGRESS NOTES
Subjective:      Patient ID: Anyi Tabares is a 50 y.o. female is here for her annual wellness exam.     HPI    PAP + HPV negative 2/2020  Menses regular   Mammogram 9/4/20. Normal FH negative for breast cancer. Vaccines: up-to-date  Had first COVID vaccine. Diet:  Eats well  Exercise:   30 minutes daily. Cardio, barre, pilates.          Health Maintenance   Topic Date Due    DTaP/Tdap/Td vaccine (2 - Td) 08/06/2024    Cervical cancer screen  02/04/2025    Lipid screen  01/29/2026    Flu vaccine  Completed    Pneumococcal 0-64 years Vaccine  Completed    Hepatitis C screen  Completed    HIV screen  Completed    Hepatitis A vaccine  Aged Out    Hepatitis B vaccine  Aged Out    Hib vaccine  Aged Out    Meningococcal (ACWY) vaccine  Aged Out           Outpatient Medications Marked as Taking for the 2/8/21 encounter (Office Visit) with Nico Jack MD   Medication Sig Dispense Refill    Budesonide-Formoterol Fumarate (SYMBICORT IN) Inhale into the lungs      omeprazole (PRILOSEC) 20 MG delayed release capsule Take 20 mg by mouth once      diclofenac sodium (VOLTAREN) 1 % GEL Apply 2 g topically 4 times daily 3 Tube 0    Gluc-Chonn-MSM-Boswellia-Vit D (GLUCOSAMINE CHOND TRIPLE/VIT D PO) Take 1 tablet by mouth daily      fluticasone (FLONASE) 50 MCG/ACT nasal spray 1 spray by Nasal route daily 3 Bottle 3       Allergies   Allergen Reactions    Dexamethasone Acetate Other (See Comments)     CONFUSION       Patient Active Problem List   Diagnosis    Acne    Asthma    Allergic rhinitis    PMDD (premenstrual dysphoric disorder)    Acne rosacea    Eosinophilic esophagitis    Esophageal web        Past Medical History:   Diagnosis Date    Asthma     Esophageal web 09/2019    PVC (premature ventricular contraction)        Past Surgical History:   Procedure Laterality Date    BREAST RECONSTRUCTION      reduction 1997    UPPER GASTROINTESTINAL ENDOSCOPY N/A 11/1/2019    EGD WITH WILLIAM DILATION 7,9,11,12,AND 14 UNDER FLUORO WITH PEDIATRIC SCOPE, MAC ANESTHESIA performed by Manan Thomas MD at [address] [facility] [facility] N/A 11/1/2019    EGD BIOPSY ESOPHAGUS FOR EOE performed by Manan Thomas MD at [address] [facility] [facility] N/A 12/6/2019    EGD DILATION BALLOON performed by Manan Thomas MD at [address] [facility] [facility] N/A 12/6/2019    EGD BIOPSY performed by Manan Thomas MD at [facility] ENDOSCOPY       Social History     Tobacco Use    Smoking status: Never Smoker    Smokeless tobacco: Never Used   Substance Use Topics    Alcohol use: Yes     Comment: less than one a week    Drug use: No       Family History   Problem Relation Age of Onset    Hypertension Mother     Hypertension Father     Coronary Art Dis Maternal Grandmother 72    Coronary Art Dis Maternal Uncle        Review of Systems  Review of Systems   Constitutional: Negative for activity change, appetite change, fatigue and unexpected weight change. HENT: Negative for congestion, hearing loss, nosebleeds, sore throat, tinnitus, trouble swallowing and voice change. Eyes: Negative for visual disturbance. Respiratory: Negative for choking, chest tightness, shortness of breath and wheezing. Sees Dr. Juan Miguel James for asthma. Well controlled   Cardiovascular: Positive for palpitations. Negative for chest pain and leg swelling. Self limited palpitations - infrequent. No associated chest pain, light headedness, sweating. Gastrointestinal: Negative for abdominal pain, anal bleeding, blood in stool, constipation, diarrhea and nausea. Genitourinary: Negative for dysuria, flank pain, frequency, hematuria, pelvic pain, vaginal bleeding and vaginal discharge. Musculoskeletal: Negative for arthralgias, back pain and myalgias. Skin: Positive for rash. Took aldactone for acne in the past.  Has breakout from mask. Neurological: Negative for weakness, light-headedness and headaches. Hematological: Does not bruise/bleed easily. Psychiatric/Behavioral: Negative for dysphoric mood and sleep disturbance. The patient is not nervous/anxious.         Lab Results   Component Value Date     01/29/2021    K 4.4 01/29/2021     01/29/2021    CO2 22 01/29/2021    BUN 12 01/29/2021    CREATININE 0.83 01/29/2021    GLUCOSE 99 01/29/2021    CALCIUM 9.2 01/29/2021    PROT 6.6 01/29/2021    LABALBU 4.1 01/29/2021    BILITOT 0.5 01/29/2021    ALKPHOS 61 01/29/2021    AST 28 01/29/2021    ALT 20 01/29/2021    LABGLOM 84 01/29/2021    GFRAA 97 01/29/2021    AGRATIO 1.6 01/29/2021    GLOB 2.5 01/29/2021        Lab Results   Component Value Date    CHOL 193 01/29/2021    CHOL 183 01/30/2020    CHOL 157 02/05/2019     Lab Results   Component Value Date    TRIG 67 01/29/2021    TRIG 48 01/30/2020    TRIG 50 02/05/2019     Lab Results   Component Value Date    HDL 67 01/29/2021    HDL 82 (H) 01/30/2020    HDL 67 (H) 02/05/2019     Lab Results   Component Value Date    LDLCALC 114 (H) 01/29/2021    LDLCALC 91 01/30/2020    LDLCALC 80 02/05/2019     Lab Results   Component Value Date    LABVLDL 12 01/29/2021    LABVLDL 10 01/30/2020    LABVLDL 10 02/05/2019     Lab Results   Component Value Date    CHOLHDLRATIO 2.6 07/19/2013     TSH   Date Value Ref Range Status   01/29/2021 0.822 0.450 - 4.500 uIU/mL Final   01/30/2020 0.73 0.27 - 4.20 uIU/mL Final   02/05/2019 1.15 0.27 - 4.20 uIU/mL Final   01/12/2018 1.130 0.450 - 4.500 uIU/mL Final   04/18/2016 0.83 0.27 - 4.20 uIU/mL Final   04/30/2014 1.02 uIU/mL Final     Lab Results   Component Value Date    WBC 5.3 01/29/2021    HGB 13.6 01/29/2021    HCT 40.6 01/29/2021    MCV 85 01/29/2021     01/29/2021     Objective:   Physical Exam  .  Wt Readings from Last 3 Encounters:   02/08/21 141 lb (64 kg)   02/19/20 139 lb (63 kg)   02/04/20 135 lb (61.2 kg)     Temp Readings from Last 3 Encounters:   02/08/21 97.8 °F (36.6 °C) (Temporal)   02/19/20 96.1 °F (35.6 °C) (Oral)   02/04/20 96.6 °F (35.9 °C) (Oral)     BP Readings from Last 3 Encounters:   02/08/21 132/86   02/19/20 129/75   02/04/20 131/81     Pulse Readings from Last 3 Encounters:   02/08/21 61   02/19/20 62   02/04/20 60        Physical Exam  Constitutional:       Appearance: Normal appearance. She is well-developed. HENT:      Head: Normocephalic and atraumatic. Right Ear: Hearing, tympanic membrane, ear canal and external ear normal.      Left Ear: Hearing, tympanic membrane, ear canal and external ear normal.      Nose: Nose normal.   Eyes:      General: Lids are normal.      Conjunctiva/sclera: Conjunctivae normal.   Neck:      Musculoskeletal: Neck supple. Thyroid: No thyroid mass or thyromegaly. Trachea: Trachea normal.   Cardiovascular:      Rate and Rhythm: Normal rate and regular rhythm. Pulses: Normal pulses. Heart sounds: Normal heart sounds. No murmur. Pulmonary:      Effort: Pulmonary effort is normal.      Breath sounds: Normal breath sounds. Abdominal:      General: Bowel sounds are normal.      Palpations: Abdomen is soft. Tenderness: There is no abdominal tenderness. Hernia: No hernia is present. Lymphadenopathy:      Head:      Right side of head: No submandibular adenopathy. Left side of head: No submandibular adenopathy. Cervical: No cervical adenopathy. Skin:     General: Skin is warm and dry. Findings: No lesion or rash. Comments: No abnormal appearing lesions on exposed skin   Neurological:      Mental Status: She is alert and oriented to person, place, and time. Gait: Gait normal.      Deep Tendon Reflexes:      Reflex Scores:       Patellar reflexes are 2+ on the right side and 2+ on the left side.   Psychiatric:         Speech: Speech normal.         Behavior: Behavior normal.         Judgment: Judgment normal.           Assessment and Plan

## 2021-03-23 ENCOUNTER — PATIENT MESSAGE (OUTPATIENT)
Dept: FAMILY MEDICINE CLINIC | Age: 46
End: 2021-03-23

## 2021-03-23 NOTE — TELEPHONE ENCOUNTER
Tried calling pt to see if any other sxs. Left message to call back. Sent a [facility] message for sxs and to do an e-visit as well. Pt message  In the last few days I've had an urgency to urinate frequently and it's hard to hold. Since I haven't had this problem in the past I suspect I have a UTI and I was wondering if I could get some medication for this? I use the [facility] on [address]. I've tried [facility] but this is beyond that.     Please advise  Thank you

## 2021-03-23 NOTE — TELEPHONE ENCOUNTER
From: Byron Draft  To: Bebe Gaona MD  Sent: 3/23/2021 12:49 PM EDT  Subject: Non-Urgent Lizzette Ferrell,  In the last few days I've had an urgency to urinate frequently and it's hard to hold. Since I haven't had this problem in the past I suspect I have a UTI and I was wondering if I could get some medication for this? I use the [facility] on [address]. I've tried [facility] but this is beyond that.     Thanks,  Lena Hartman

## 2021-03-25 ENCOUNTER — E-VISIT (OUTPATIENT)
Dept: FAMILY MEDICINE CLINIC | Age: 46
End: 2021-03-25
Payer: COMMERCIAL

## 2021-03-25 DIAGNOSIS — N30.00 ACUTE CYSTITIS WITHOUT HEMATURIA: Primary | ICD-10-CM

## 2021-03-25 PROCEDURE — 99421 OL DIG E/M SVC 5-10 MIN: CPT | Performed by: FAMILY MEDICINE

## 2021-03-25 RX ORDER — NITROFURANTOIN 25; 75 MG/1; MG/1
100 CAPSULE ORAL 2 TIMES DAILY
Qty: 14 CAPSULE | Refills: 0 | Status: SHIPPED | OUTPATIENT
Start: 2021-03-25 | End: 2021-04-01

## 2021-03-25 NOTE — PROGRESS NOTES
Patient Active Problem List   Diagnosis    Acne    Asthma    Allergic rhinitis    PMDD (premenstrual dysphoric disorder)    Acne rosacea    Eosinophilic esophagitis    Esophageal web     Past Medical History:   Diagnosis Date    Asthma     Esophageal web 09/2019    PVC (premature ventricular contraction)      Past Surgical History:   Procedure Laterality Date    BREAST RECONSTRUCTION      reduction 1997    UPPER GASTROINTESTINAL ENDOSCOPY N/A 11/1/2019    EGD WITH SAVORY  DILATION 7,9,11,12,AND 14 UNDER FLUORO WITH PEDIATRIC SCOPE, MAC ANESTHESIA performed by Meg Joiner MD at [facility] N/A 11/1/2019    EGD BIOPSY ESOPHAGUS FOR EOE performed by Meg Joiner MD at [facility] N/A 12/6/2019    EGD DILATION BALLOON performed by Meg Joiner MD at [facility] N/A 12/6/2019    EGD BIOPSY performed by Meg Joiner MD at [facility] ENDOSCOPY      Social History     Tobacco Use    Smoking status: Never Smoker    Smokeless tobacco: Never Used   Substance Use Topics    Alcohol use: Yes     Comment: less than one a week    Drug use: No      Allergies   Allergen Reactions    Dexamethasone Acetate Other (See Comments)     CONFUSION      Current Outpatient Medications on File Prior to Visit   Medication Sig Dispense Refill    Budesonide-Formoterol Fumarate (SYMBICORT IN) Inhale into the lungs      erythromycin with ethanol (EMGEL) 2 % gel Apply topically 2 times daily 60 g 2    omeprazole (PRILOSEC) 20 MG delayed release capsule Take 20 mg by mouth once      diclofenac sodium (VOLTAREN) 1 % GEL Apply 2 g topically 4 times daily 3 Tube 0    Gluc-Chonn-MSM-Boswellia-Vit D (GLUCOSAMINE CHOND TRIPLE/VIT D PO) Take 1 tablet by mouth daily      fluticasone (FLONASE) 50 MCG/ACT nasal spray 1 spray by Nasal route daily 3 Bottle 3     No current facility-administered medications on file prior to visit. Lab Results   Component Value Date     01/29/2021    K 4.4 01/29/2021     01/29/2021    CO2 22 01/29/2021    BUN 12 01/29/2021    CREATININE 0.83 01/29/2021    GLUCOSE 99 01/29/2021    CALCIUM 9.2 01/29/2021         No results found for: VOL, APPEARANCE, COLORU, LABSPEC, LABPH, LEUKBLD, NITRU, GLUCOSEU, KETUA, UROBILINOGEN, KETUA, UROBILINOGEN, BILIRUBINUR, OCBU      Diagnosis Orders   1. Acute cystitis without hematuria  nitrofurantoin, macrocrystal-monohydrate, (MACROBID) 100 MG capsule     5-10 minutes were spent on the digital evaluation and management of this patient.

## 2021-07-25 ENCOUNTER — PATIENT MESSAGE (OUTPATIENT)
Dept: FAMILY MEDICINE CLINIC | Age: 46
End: 2021-07-25

## 2021-07-25 DIAGNOSIS — N93.8 DUB (DYSFUNCTIONAL UTERINE BLEEDING): ICD-10-CM

## 2021-07-25 DIAGNOSIS — N92.0 MENORRHAGIA WITH REGULAR CYCLE: Primary | ICD-10-CM

## 2021-07-26 RX ORDER — NORETHINDRONE ACETATE AND ETHINYL ESTRADIOL 1.5-30(21)
1 KIT ORAL DAILY
Qty: 3 PACKET | Refills: 1 | Status: SHIPPED | OUTPATIENT
Start: 2021-07-26 | End: 2022-01-19

## 2021-07-26 NOTE — TELEPHONE ENCOUNTER
From: Marie Parker  To: Leonela Marmolejo MD  Sent: 7/25/2021 11:47 AM EDT  Subject: Prescription Question    Can I please get a prescription for Junel FE 1.5/30 that you had prescribed at one time? I use it to control heavy menstruation and am out.  Thank you

## 2022-01-19 DIAGNOSIS — N93.8 DUB (DYSFUNCTIONAL UTERINE BLEEDING): ICD-10-CM

## 2022-01-19 RX ORDER — NORETHINDRONE ACETATE AND ETHINYL ESTRADIOL AND FERROUS FUMARATE 1.5-30(21)
KIT ORAL
Qty: 84 TABLET | Refills: 0 | Status: SHIPPED | OUTPATIENT
Start: 2022-01-19 | End: 2022-02-18 | Stop reason: ALTCHOICE

## 2022-02-12 ENCOUNTER — PATIENT MESSAGE (OUTPATIENT)
Dept: FAMILY MEDICINE CLINIC | Age: 47
End: 2022-02-12

## 2022-02-12 DIAGNOSIS — Z00.00 WELL ADULT EXAM: Primary | ICD-10-CM

## 2022-02-17 DIAGNOSIS — D64.9 ANEMIA, UNSPECIFIED TYPE: Primary | ICD-10-CM

## 2022-02-17 DIAGNOSIS — Z00.00 WELL ADULT EXAM: ICD-10-CM

## 2022-02-17 LAB
A/G RATIO: 1.5 (ref 1.1–2.2)
ALBUMIN SERPL-MCNC: 4.2 G/DL (ref 3.4–5)
ALP BLD-CCNC: 54 U/L (ref 40–129)
ALT SERPL-CCNC: 19 U/L (ref 10–40)
ANION GAP SERPL CALCULATED.3IONS-SCNC: 14 MMOL/L (ref 3–16)
AST SERPL-CCNC: 25 U/L (ref 15–37)
BILIRUB SERPL-MCNC: 0.4 MG/DL (ref 0–1)
BUN BLDV-MCNC: 12 MG/DL (ref 7–20)
CALCIUM SERPL-MCNC: 9.5 MG/DL (ref 8.3–10.6)
CHLORIDE BLD-SCNC: 104 MMOL/L (ref 99–110)
CHOLESTEROL, TOTAL: 195 MG/DL (ref 0–199)
CO2: 21 MMOL/L (ref 21–32)
CREAT SERPL-MCNC: 0.9 MG/DL (ref 0.6–1.1)
FERRITIN: 15.2 NG/ML (ref 15–150)
GFR AFRICAN AMERICAN: >60
GFR NON-AFRICAN AMERICAN: >60
GLUCOSE BLD-MCNC: 136 MG/DL (ref 70–99)
HCT VFR BLD CALC: 37.4 % (ref 36–48)
HDLC SERPL-MCNC: 66 MG/DL (ref 40–60)
HEMOGLOBIN: 11.8 G/DL (ref 12–16)
IRON SATURATION: 25 % (ref 15–50)
IRON: 102 UG/DL (ref 37–145)
LDL CHOLESTEROL CALCULATED: 114 MG/DL
MCH RBC QN AUTO: 24.6 PG (ref 26–34)
MCHC RBC AUTO-ENTMCNC: 31.7 G/DL (ref 31–36)
MCV RBC AUTO: 77.6 FL (ref 80–100)
PDW BLD-RTO: 18 % (ref 12.4–15.4)
PLATELET # BLD: 311 K/UL (ref 135–450)
PMV BLD AUTO: 8.4 FL (ref 5–10.5)
POTASSIUM SERPL-SCNC: 4.6 MMOL/L (ref 3.5–5.1)
RBC # BLD: 4.82 M/UL (ref 4–5.2)
SODIUM BLD-SCNC: 139 MMOL/L (ref 136–145)
TOTAL IRON BINDING CAPACITY: 403 UG/DL (ref 260–445)
TOTAL PROTEIN: 7 G/DL (ref 6.4–8.2)
TRIGL SERPL-MCNC: 73 MG/DL (ref 0–150)
TSH REFLEX: 0.67 UIU/ML (ref 0.27–4.2)
VLDLC SERPL CALC-MCNC: 15 MG/DL
WBC # BLD: 6.1 K/UL (ref 4–11)

## 2022-02-18 ENCOUNTER — OFFICE VISIT (OUTPATIENT)
Dept: FAMILY MEDICINE CLINIC | Age: 47
End: 2022-02-18
Payer: COMMERCIAL

## 2022-02-18 VITALS
HEART RATE: 91 BPM | HEIGHT: 65 IN | TEMPERATURE: 97.1 F | SYSTOLIC BLOOD PRESSURE: 134 MMHG | OXYGEN SATURATION: 99 % | DIASTOLIC BLOOD PRESSURE: 96 MMHG | BODY MASS INDEX: 22.59 KG/M2 | WEIGHT: 135.6 LBS | RESPIRATION RATE: 14 BRPM

## 2022-02-18 DIAGNOSIS — Z12.11 COLON CANCER SCREENING: ICD-10-CM

## 2022-02-18 DIAGNOSIS — Z30.41 ENCOUNTER FOR BIRTH CONTROL PILLS MAINTENANCE: ICD-10-CM

## 2022-02-18 DIAGNOSIS — D50.0 IRON DEFICIENCY ANEMIA DUE TO CHRONIC BLOOD LOSS: ICD-10-CM

## 2022-02-18 DIAGNOSIS — Z00.00 WELL ADULT EXAM: Primary | ICD-10-CM

## 2022-02-18 LAB — HBA1C MFR BLD: 5.3 %

## 2022-02-18 PROCEDURE — 83036 HEMOGLOBIN GLYCOSYLATED A1C: CPT | Performed by: FAMILY MEDICINE

## 2022-02-18 PROCEDURE — 99396 PREV VISIT EST AGE 40-64: CPT | Performed by: FAMILY MEDICINE

## 2022-02-18 RX ORDER — NORETHINDRONE ACETATE AND ETHINYL ESTRADIOL 1MG-20(21)
1 KIT ORAL DAILY
COMMUNITY
End: 2022-02-18 | Stop reason: SDUPTHER

## 2022-02-18 RX ORDER — NORETHINDRONE ACETATE AND ETHINYL ESTRADIOL 1MG-20(21)
1 KIT ORAL DAILY
Qty: 3 PACKET | Refills: 3 | Status: SHIPPED | OUTPATIENT
Start: 2022-02-18

## 2022-02-18 ASSESSMENT — ENCOUNTER SYMPTOMS
CHEST TIGHTNESS: 0
ABDOMINAL PAIN: 0
CONSTIPATION: 0
WHEEZING: 0
BLOOD IN STOOL: 0
BACK PAIN: 0
SHORTNESS OF BREATH: 0
SORE THROAT: 0
COUGH: 0
COLOR CHANGE: 0
VOICE CHANGE: 0
DIARRHEA: 0
NAUSEA: 0
CHOKING: 0
TROUBLE SWALLOWING: 0
ANAL BLEEDING: 0

## 2022-02-18 ASSESSMENT — PATIENT HEALTH QUESTIONNAIRE - PHQ9
9. THOUGHTS THAT YOU WOULD BE BETTER OFF DEAD, OR OF HURTING YOURSELF: 0
SUM OF ALL RESPONSES TO PHQ QUESTIONS 1-9: 0
SUM OF ALL RESPONSES TO PHQ QUESTIONS 1-9: 0
1. LITTLE INTEREST OR PLEASURE IN DOING THINGS: 0
5. POOR APPETITE OR OVEREATING: 0
7. TROUBLE CONCENTRATING ON THINGS, SUCH AS READING THE NEWSPAPER OR WATCHING TELEVISION: 0
8. MOVING OR SPEAKING SO SLOWLY THAT OTHER PEOPLE COULD HAVE NOTICED. OR THE OPPOSITE, BEING SO FIGETY OR RESTLESS THAT YOU HAVE BEEN MOVING AROUND A LOT MORE THAN USUAL: 0
SUM OF ALL RESPONSES TO PHQ QUESTIONS 1-9: 0
SUM OF ALL RESPONSES TO PHQ9 QUESTIONS 1 & 2: 0
10. IF YOU CHECKED OFF ANY PROBLEMS, HOW DIFFICULT HAVE THESE PROBLEMS MADE IT FOR YOU TO DO YOUR WORK, TAKE CARE OF THINGS AT HOME, OR GET ALONG WITH OTHER PEOPLE: 0
2. FEELING DOWN, DEPRESSED OR HOPELESS: 0
SUM OF ALL RESPONSES TO PHQ QUESTIONS 1-9: 0
4. FEELING TIRED OR HAVING LITTLE ENERGY: 0
3. TROUBLE FALLING OR STAYING ASLEEP: 0
6. FEELING BAD ABOUT YOURSELF - OR THAT YOU ARE A FAILURE OR HAVE LET YOURSELF OR YOUR FAMILY DOWN: 0

## 2022-02-18 NOTE — PROGRESS NOTES
Subjective:      Patient ID: Kee Wise is a 48 y.o. female is here for her annual wellness exam.     HPI    PAP + HPV negative 2/4/20. Menses: regular   Had spotting; saw Dr. Jason Singh - had polyps removed   Colon cancer screening: due initial screening. Mammogram: normal 9/7/21. FH + maternal cousin had breast cancer in her 45s. Vaccines: due COVID 2nd dose and flu  Diet:  Eats well. Exercise:  Every day, 30 minutes. Bike + weights. Anemia: donates blood every 6 weeks; last time told was anemic. She is taking PPI; tried to stop it and had recurrent sxs. Has eosinophilic esophagitis.      The 10-year ASCVD risk score (Claire Alexandra, et al., 2013) is: 0.9%    Values used to calculate the score:      Age: 48 years      Sex: Female      Is Non- : No      Diabetic: No      Tobacco smoker: No      Systolic Blood Pressure: 446 mmHg      Is BP treated: No      HDL Cholesterol: 66 mg/dL      Total Cholesterol: 195 mg/dL     Health Maintenance   Topic Date Due    Depression Monitoring  Never done    Colorectal Cancer Screen  Never done    DTaP/Tdap/Td vaccine (2 - Td or Tdap) 08/06/2024    Cervical cancer screen  02/04/2025    Lipid screen  02/17/2027    Pneumococcal 0-64 years Vaccine (2 of 2 - PPSV23) 08/04/2038    Flu vaccine  Completed    COVID-19 Vaccine  Completed    Hepatitis C screen  Completed    HIV screen  Completed    Hepatitis A vaccine  Aged Out    Hepatitis B vaccine  Aged Out    Hib vaccine  Aged Out    Meningococcal (ACWY) vaccine  Aged Out           Outpatient Medications Marked as Taking for the 2/18/22 encounter (Office Visit) with Juan Jose Reza MD   Medication Sig Dispense Refill    norethindrone-ethinyl estradiol (JUNEL FE 1/20) 1-20 MG-MCG per tablet Take 1 tablet by mouth daily      omeprazole (PRILOSEC) 20 MG delayed release capsule Take 1 capsule by mouth Daily 30 capsule 0    Budesonide-Formoterol Fumarate (SYMBICORT IN) Inhale into the lungs  Gluc-Chonn-MSM-Boswellia-Vit D (GLUCOSAMINE CHOND TRIPLE/VIT D PO) Take 1 tablet by mouth daily      fluticasone (FLONASE) 50 MCG/ACT nasal spray 1 spray by Nasal route daily 3 Bottle 3       Allergies   Allergen Reactions    Dexamethasone Acetate Other (See Comments)     CONFUSION       Patient Active Problem List   Diagnosis    Acne    Asthma    Allergic rhinitis    PMDD (premenstrual dysphoric disorder)    Acne rosacea    Eosinophilic esophagitis    Esophageal web        Past Medical History:   Diagnosis Date    Asthma     Esophageal web 09/2019    PVC (premature ventricular contraction)        Past Surgical History:   Procedure Laterality Date    BREAST RECONSTRUCTION      reduction 1997    UPPER GASTROINTESTINAL ENDOSCOPY N/A 11/1/2019    EGD WITH SAVORY  DILATION 7,9,11,12,AND 14 UNDER FLUORO WITH PEDIATRIC SCOPE, MAC ANESTHESIA performed by Paula Westbrook MD at [facility] N/A 11/1/2019    EGD BIOPSY ESOPHAGUS FOR EOE performed by Paula Westbrook MD at [facility] N/A 12/6/2019    EGD DILATION BALLOON performed by Paula Westbrook MD at [facility] N/A 12/6/2019    EGD [facility]OPSY [facility] at [facility] History     Tobacco Use    Smoking status: Never Smoker    Smokeless tobacco: Never Used   Vaping Use    Vaping Use: Never used   Substance Use Topics    Alcohol use: Yes     Comment: less than one a week    Drug use: No       Family History   Problem Relation Age of Onset    Hypertension Mother     Hypertension Father     Coronary Art Dis Maternal Grandmother 72    Coronary Art Dis Maternal Uncle        Review of Systems  Review of Systems   Constitutional: Negative for activity change, appetite change, fatigue and unexpected weight change. HENT: Positive for tinnitus.  Negative for congestion, hearing loss, nosebleeds, sore throat, trouble swallowing and voice change. Mild intermittent tinnitus. Uses ear protection. Eyes: Negative for visual disturbance. Respiratory: Negative for cough, choking, chest tightness, shortness of breath and wheezing. Cardiovascular: Negative for chest pain, palpitations and leg swelling. Asthma well controlled. Gastrointestinal: Negative for abdominal pain, anal bleeding, blood in stool, constipation, diarrhea and nausea. Genitourinary: Negative for dysuria, flank pain, frequency, hematuria, pelvic pain, urgency, vaginal bleeding and vaginal discharge. Musculoskeletal: Positive for neck pain. Negative for arthralgias, back pain and myalgias. Chronic neck pain. Sees Dr. Katie Dillard. Voltaren prn and stretches are effective   Skin: Negative for color change and rash. Allergic/Immunologic: Negative for environmental allergies. Neurological: Negative for light-headedness and headaches. Hematological: Does not bruise/bleed easily. Psychiatric/Behavioral: Negative for dysphoric mood and sleep disturbance. The patient is not nervous/anxious.       Lab Results   Component Value Date     02/17/2022    K 4.6 02/17/2022     02/17/2022    CO2 21 02/17/2022    BUN 12 02/17/2022    CREATININE 0.9 02/17/2022    GLUCOSE 136 (H) 02/17/2022    CALCIUM 9.5 02/17/2022    PROT 7.0 02/17/2022    LABALBU 4.2 02/17/2022    BILITOT 0.4 02/17/2022    ALKPHOS 54 02/17/2022    AST 25 02/17/2022    ALT 19 02/17/2022    LABGLOM >60 02/17/2022    GFRAA >60 02/17/2022    AGRATIO 1.5 02/17/2022    GLOB 2.5 01/29/2021        Lab Results   Component Value Date    WBC 6.1 02/17/2022    HGB 11.8 (L) 02/17/2022    HCT 37.4 02/17/2022    MCV 77.6 (L) 02/17/2022     02/17/2022     TSH   Date Value Ref Range Status   02/17/2022 0.67 0.27 - 4.20 uIU/mL Final   01/29/2021 0.822 0.450 - 4.500 uIU/mL Final   01/30/2020 0.73 0.27 - 4.20 uIU/mL Final   02/05/2019 1.15 0.27 - side.       Femoral pulses are 2+ on the right side and 2+ on the left side. Dorsalis pedis pulses are 2+ on the right side and 2+ on the left side. Posterior tibial pulses are 2+ on the right side and 2+ on the left side. Heart sounds: Normal heart sounds. No murmur heard. Pulmonary:      Effort: Pulmonary effort is normal. No respiratory distress. Breath sounds: Normal breath sounds. Chest:   Breasts: Breasts are symmetrical.      Right: No inverted nipple, mass, nipple discharge, skin change, tenderness or supraclavicular adenopathy. Left: No inverted nipple, mass, nipple discharge, skin change, tenderness or supraclavicular adenopathy. Abdominal:      General: Bowel sounds are normal.      Tenderness: There is no abdominal tenderness. Hernia: No hernia is present. Musculoskeletal:         General: Normal range of motion. Cervical back: Neck supple. Lymphadenopathy:      Head:      Right side of head: No submental or submandibular adenopathy. Left side of head: No submental or submandibular adenopathy. Cervical: No cervical adenopathy. Upper Body:      Right upper body: No supraclavicular adenopathy. Left upper body: No supraclavicular adenopathy. Skin:     General: Skin is warm and dry. Findings: No bruising, lesion or rash. Neurological:      Mental Status: She is alert. Deep Tendon Reflexes: Reflexes are normal and symmetric. Reflex Scores:       Patellar reflexes are 2+ on the right side and 2+ on the left side. Psychiatric:         Speech: Speech normal.         Behavior: Behavior normal.         Thought Content: Thought content normal.         Judgment: Judgment normal.           Assessment and Plan       Diagnosis Orders   1. Well adult exam  POCT glycosylated hemoglobin (Hb A1C)  Exercise, diet, calcium, Vitamin D addressed. PAP every 5 years.   Mammogram annual   DT every 10 years  Influenza vaccine annually 2. Colon cancer screening  Torsten Yañez MD, Gastroenterology, [facility]  Colonoscopy risks and benefits addressed and she agrees to schedule. 3. Iron deficiency anemia due to chronic blood loss  Ferritin    Iron and TIBC    CBC  Likely from menses and blood donation. Repeat in 4 to 6 weeks; continue supplement. Goal ferritin > 50   4. Encounter for birth control pills maintenance  norethindrone-ethinyl estradiol (JUNEL FE 1/20) 1-20 MG-MCG per tablet          Side effects of current medications reviewed and questions answered. Follow up in 1 year or prn.

## 2022-03-07 RX ORDER — OMEPRAZOLE 20 MG/1
20 CAPSULE, DELAYED RELEASE ORAL DAILY
Qty: 90 CAPSULE | Refills: 1 | Status: SHIPPED | OUTPATIENT
Start: 2022-03-07

## 2022-03-07 NOTE — TELEPHONE ENCOUNTER
Requested Prescriptions     Pending Prescriptions Disp Refills    omeprazole (PRILOSEC) 20 MG delayed release capsule [Pharmacy Med Name: OMEPRAZOLE 20MG CAPSULES] 30 capsule 0     Sig: TAKE 1 CAPSULE BY MOUTH DAILY       LOV 2/18/2022  No f/u  Labs 2/17/22

## 2022-03-09 DIAGNOSIS — D50.0 IRON DEFICIENCY ANEMIA DUE TO CHRONIC BLOOD LOSS: ICD-10-CM

## 2022-03-09 LAB
HCT VFR BLD CALC: 39.1 % (ref 36–48)
HEMOGLOBIN: 12.6 G/DL (ref 12–16)
MCH RBC QN AUTO: 25.6 PG (ref 26–34)
MCHC RBC AUTO-ENTMCNC: 32.1 G/DL (ref 31–36)
MCV RBC AUTO: 79.7 FL (ref 80–100)
PDW BLD-RTO: 19.3 % (ref 12.4–15.4)
PLATELET # BLD: 263 K/UL (ref 135–450)
PMV BLD AUTO: 8.3 FL (ref 5–10.5)
RBC # BLD: 4.91 M/UL (ref 4–5.2)
WBC # BLD: 4.2 K/UL (ref 4–11)

## 2022-03-10 LAB
FERRITIN: 18.6 NG/ML (ref 15–150)
IRON SATURATION: 8 % (ref 15–50)
IRON: 31 UG/DL (ref 37–145)
TOTAL IRON BINDING CAPACITY: 386 UG/DL (ref 260–445)

## 2022-03-11 ENCOUNTER — PATIENT MESSAGE (OUTPATIENT)
Dept: FAMILY MEDICINE CLINIC | Age: 47
End: 2022-03-11

## 2022-03-11 DIAGNOSIS — E61.1 IRON DEFICIENCY: Primary | ICD-10-CM

## 2022-03-21 NOTE — TELEPHONE ENCOUNTER
Should I continue the daily iron supplements? The colonoscopy instructions say you cant be on iron 2 weeks before so I will stop for this if its necessary.

## 2022-04-07 DIAGNOSIS — E61.1 IRON DEFICIENCY: ICD-10-CM

## 2022-04-08 LAB
FERRITIN: 21.2 NG/ML (ref 15–150)
HCT VFR BLD CALC: 40.5 % (ref 36–48)
HEMOGLOBIN: 13.1 G/DL (ref 12–16)
IRON SATURATION: 27 % (ref 15–50)
IRON: 94 UG/DL (ref 37–145)
MCH RBC QN AUTO: 26.9 PG (ref 26–34)
MCHC RBC AUTO-ENTMCNC: 32.3 G/DL (ref 31–36)
MCV RBC AUTO: 83.2 FL (ref 80–100)
PDW BLD-RTO: 19 % (ref 12.4–15.4)
PLATELET # BLD: 230 K/UL (ref 135–450)
PMV BLD AUTO: 8.7 FL (ref 5–10.5)
RBC # BLD: 4.87 M/UL (ref 4–5.2)
TOTAL IRON BINDING CAPACITY: 347 UG/DL (ref 260–445)
WBC # BLD: 4.6 K/UL (ref 4–11)

## 2022-05-18 ENCOUNTER — PATIENT MESSAGE (OUTPATIENT)
Dept: FAMILY MEDICINE CLINIC | Age: 47
End: 2022-05-18

## 2022-05-19 RX ORDER — MOXIFLOXACIN 5 MG/ML
1 SOLUTION/ DROPS OPHTHALMIC 3 TIMES DAILY
Qty: 3 ML | Refills: 0 | Status: SHIPPED | OUTPATIENT
Start: 2022-05-19 | End: 2022-05-26

## 2022-05-19 NOTE — TELEPHONE ENCOUNTER
From: Najma Ochoa  To: Dr. Mulu Cárdenas: 5/18/2022 3:58 PM EDT  Subject: conjunctivitis    Hi Dr George Lan,    It seems I have pink eye in my right eye. The sclera is red and inflamed with drainage. 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 Can I please have antibiotic eye drops called in to the [place] [id number] on [address]? Thanks!

## 2022-06-12 ENCOUNTER — PATIENT MESSAGE (OUTPATIENT)
Dept: FAMILY MEDICINE CLINIC | Age: 47
End: 2022-06-12

## 2022-06-13 RX ORDER — FLUCONAZOLE 150 MG/1
150 TABLET ORAL ONCE
Qty: 1 TABLET | Refills: 0 | Status: SHIPPED | OUTPATIENT
Start: 2022-06-13 | End: 2022-06-13

## 2022-06-13 NOTE — TELEPHONE ENCOUNTER
From: Perla Fears  To: Dr. Grimaldo So: 6/12/2022 11:21 AM EDT  Subject: Diflucan    Can I please get a prescription for Diflucan for a yeast infection called in to [facility] on [address]. OTC isnt working.     Thanks

## 2022-09-26 NOTE — TELEPHONE ENCOUNTER
Requested Prescriptions     Pending Prescriptions Disp Refills    fluconazole (DIFLUCAN) 150 MG tablet [Pharmacy Med Name: FLUCONAZOLE 150MG TABLETS] 1 tablet 0     Sig: TAKE 1 TABLET BY MOUTH 1 TIME FOR 1 DOSE     Last OV; 2/18/2022  Last labs; 4/7/2022  No f/u    Sent an message on [employer], reminding pt is due for an office visit.

## 2022-09-27 RX ORDER — FLUCONAZOLE 150 MG/1
TABLET ORAL
Qty: 1 TABLET | Refills: 0 | OUTPATIENT
Start: 2022-09-27

## 2022-10-25 ENCOUNTER — TELEPHONE (OUTPATIENT)
Dept: FAMILY MEDICINE CLINIC | Age: 47
End: 2022-10-25

## 2022-10-25 NOTE — PROGRESS NOTES
Subjective:      Patient ID: Uzma Estevez 50 y.o. female. is here for evaluation for elevated BP      HPI    -160/80-90 at Dr. Marnie Dewey office. 156/92 at gyne. Is same at home. Is taking Aleve 1 tab every day for neck pain. No supplements, diet pills. Takes sudafed occasionally. Was not on Sudafed when she had high BPs. Patient denies any exertional chest pain, dyspnea, syncope, orthopnea, edema or paroxysmal nocturnal dyspnea. Has palpitations almost daily, will occur on and off for about 3 minutes, then stops. Drinks 3 to 4 cups of coffee in the AM; skipped beats more in the PM.   Exercises without any problem; occ feels needs to stop pushing so hard but never has to stop. Palpitations are stable over 10 years. Is having a lot of irregular bleeding; is going to have ablation with Dr. Giacomo Bedoya. Will be off OCP after ablation. Both parents have HTN.         Labs Renal Latest Ref Rng & Units 2/17/2022 1/29/2021 1/30/2020 2/5/2019 1/12/2018   BUN 7 - 20 mg/dL 12 12 9 10 16   Cr 0.6 - 1.1 mg/dL 0.9 0.83 0.6 0.6 0.73   K 3.5 - 5.1 mmol/L 4.6 4.4 4.3 4.1 4.4   Na 136 - 145 mmol/L 139 140 143 141 141     TSH   Date Value Ref Range Status   02/17/2022 0.67 0.27 - 4.20 uIU/mL Final   01/29/2021 0.822 0.450 - 4.500 uIU/mL Final   01/30/2020 0.73 0.27 - 4.20 uIU/mL Final   02/05/2019 1.15 0.27 - 4.20 uIU/mL Final   01/12/2018 1.130 0.450 - 4.500 uIU/mL Final   04/30/2014 1.02 uIU/mL Final     Lab Results   Component Value Date    WBC 4.6 04/07/2022    HGB 13.1 04/07/2022    HCT 40.5 04/07/2022    MCV 83.2 04/07/2022     04/07/2022     Lab Results   Component Value Date    CHOL 195 02/17/2022    CHOL 193 01/29/2021    CHOL 183 01/30/2020     Lab Results   Component Value Date    TRIG 73 02/17/2022    TRIG 67 01/29/2021    TRIG 48 01/30/2020     Lab Results   Component Value Date    HDL 66 (H) 02/17/2022    HDL 67 01/29/2021    HDL 82 (H) 01/30/2020     Lab Results   Component Value Date    LDLCALC 114 (H) 02/17/2022    LDLCALC 114 (H) 01/29/2021    LDLCALC 91 01/30/2020     Lab Results   Component Value Date    LABVLDL 15 02/17/2022    LABVLDL 12 01/29/2021    LABVLDL 10 01/30/2020     Lab Results   Component Value Date    CHOLHDLRATIO 2.6 07/19/2013      Lab Results   Component Value Date/Time    ALKPHOS 54 02/17/2022 08:37 AM    ALT 19 02/17/2022 08:37 AM    AST 25 02/17/2022 08:37 AM    PROT 7.0 02/17/2022 08:37 AM    BILITOT 0.4 02/17/2022 08:37 AM    LABALBU 4.2 02/17/2022 08:37 AM        Outpatient Medications Marked as Taking for the 10/26/22 encounter (Office Visit) with Jose G Clark MD   Medication Sig Dispense Refill    omeprazole (PRILOSEC) 20 MG delayed release capsule TAKE 1 CAPSULE BY MOUTH DAILY 90 capsule 1    norethindrone-ethinyl estradiol (JUNEL FE 1/20) 1-20 MG-MCG per tablet Take 1 tablet by mouth daily 3 packet 3    Budesonide-Formoterol Fumarate (SYMBICORT IN) Inhale into the lungs      diclofenac sodium (VOLTAREN) 1 % GEL Apply 2 g topically 4 times daily 3 Tube 0    Gluc-Chonn-MSM-Boswellia-Vit D (GLUCOSAMINE CHOND TRIPLE/VIT D PO) Take 1 tablet by mouth daily      fluticasone (FLONASE) 50 MCG/ACT nasal spray 1 spray by Nasal route daily 3 Bottle 3        Allergies   Allergen Reactions    Dexamethasone Acetate Other (See Comments)     CONFUSION       Patient Active Problem List   Diagnosis    Acne    Asthma    Allergic rhinitis    PMDD (premenstrual dysphoric disorder)    Acne rosacea    Eosinophilic esophagitis    Esophageal web       Past Medical History:   Diagnosis Date    Asthma     Esophageal web 09/2019    PVC (premature ventricular contraction)        Past Surgical History:   Procedure Laterality Date    BREAST RECONSTRUCTION      reduction 1997    UPPER GASTROINTESTINAL ENDOSCOPY N/A 11/1/2019    EGD WITH SAVORY  DILATION 7,9,11,12,AND 14 UNDER FLUORO WITH PEDIATRIC SCOPE, MAC ANESTHESIA performed by Dena Euceda MD at [facility] ENDOSCOPY N/A 11/1/2019    EGD BIOPSY ESOPHAGUS FOR EOE performed by Oswald Reynolds MD at [facility] N/A 12/6/2019    EGD DILATION BALLOON performed by Oswald Reynolds MD at [facility] N/A 12/6/2019    EGD BIOPSY performed by Oswald Reynolds MD at [address] ENDOSCOPY        Family History   Problem Relation Age of Onset    Hypertension Mother     Hypertension Father     Coronary Art Dis Maternal Grandmother 72    Coronary Art Dis Maternal Uncle     Breast Cancer Maternal Cousin 36       Social History     Tobacco Use    Smoking status: Never    Smokeless tobacco: Never   Vaping Use    Vaping Use: Never used   Substance Use Topics    Alcohol use: Yes     Comment: less than one a week    Drug use: No            Review of Systems  Review of Systems    Objective:   Physical Exam  Vitals:    10/26/22 1453 10/26/22 1504 10/26/22 1542   BP: (!) 160/90 (!) 160/96 (!) 162/98   Site:  Right Upper Arm Left Upper Arm   Position:  Sitting Sitting   Pulse: 63     Resp: 14     Temp: 97.9 °F (36.6 °C)     TempSrc: Temporal     SpO2: 100%     Weight: 138 lb 9.6 oz (62.9 kg)       Wt Readings from Last 3 Encounters:   10/26/22 138 lb 9.6 oz (62.9 kg)   02/18/22 135 lb 9.6 oz (61.5 kg)   02/08/21 141 lb (64 kg)        Physical Exam    NAD    Skin is warm and dry. No rash. Well hydrated  Alert and oriented x 3. Mood and affect are normal.  The neck is supple and free of adenopathy or masses, the thyroid is normal without enlargement or nodules. Chest: clear with no wheezes or rales. No retractions, or use of accessory muscles noted. Cardiovascular: PMI is not displaced, and no thrill noted. Regular rate and rhythm with no rub, murmur or gallop. No peripheral edema. The abdomen is soft without tenderness, guarding, mass, rebound or organomegaly. No abdominal bruits   Aorta, femoral, DP and PT pulses intact.     EKG normal Assessment:       Diagnosis Orders   1. Essential hypertension, benign  EKG 12 Lead - Clinic Performed    Urinalysis with Microscopic    Basic Metabolic Panel    lisinopril (PRINIVIL;ZESTRIL) 10 MG tablet             Plan:      Continue healthy diet and exercise. Side effects of current medications reviewed and questions answered. Follow up in 4 weeks or prn. Labs prior to follow up.

## 2022-10-26 ENCOUNTER — OFFICE VISIT (OUTPATIENT)
Dept: FAMILY MEDICINE CLINIC | Age: 47
End: 2022-10-26
Payer: COMMERCIAL

## 2022-10-26 VITALS
BODY MASS INDEX: 23.06 KG/M2 | DIASTOLIC BLOOD PRESSURE: 98 MMHG | OXYGEN SATURATION: 100 % | HEART RATE: 63 BPM | TEMPERATURE: 97.9 F | SYSTOLIC BLOOD PRESSURE: 162 MMHG | RESPIRATION RATE: 14 BRPM | WEIGHT: 138.6 LBS

## 2022-10-26 DIAGNOSIS — I10 ESSENTIAL HYPERTENSION, BENIGN: Primary | ICD-10-CM

## 2022-10-26 PROCEDURE — 3078F DIAST BP <80 MM HG: CPT | Performed by: FAMILY MEDICINE

## 2022-10-26 PROCEDURE — 93000 ELECTROCARDIOGRAM COMPLETE: CPT | Performed by: FAMILY MEDICINE

## 2022-10-26 PROCEDURE — 3074F SYST BP LT 130 MM HG: CPT | Performed by: FAMILY MEDICINE

## 2022-10-26 PROCEDURE — 99214 OFFICE O/P EST MOD 30 MIN: CPT | Performed by: FAMILY MEDICINE

## 2022-10-26 RX ORDER — LISINOPRIL 10 MG/1
10 TABLET ORAL DAILY
Qty: 30 TABLET | Refills: 2 | Status: SHIPPED | OUTPATIENT
Start: 2022-10-26

## 2022-10-26 SDOH — ECONOMIC STABILITY: FOOD INSECURITY: WITHIN THE PAST 12 MONTHS, THE FOOD YOU BOUGHT JUST DIDN'T LAST AND YOU DIDN'T HAVE MONEY TO GET MORE.: NEVER TRUE

## 2022-10-26 SDOH — ECONOMIC STABILITY: FOOD INSECURITY: WITHIN THE PAST 12 MONTHS, YOU WORRIED THAT YOUR FOOD WOULD RUN OUT BEFORE YOU GOT MONEY TO BUY MORE.: NEVER TRUE

## 2022-10-26 ASSESSMENT — PATIENT HEALTH QUESTIONNAIRE - PHQ9
SUM OF ALL RESPONSES TO PHQ QUESTIONS 1-9: 0
6. FEELING BAD ABOUT YOURSELF - OR THAT YOU ARE A FAILURE OR HAVE LET YOURSELF OR YOUR FAMILY DOWN: 0
1. LITTLE INTEREST OR PLEASURE IN DOING THINGS: 0
4. FEELING TIRED OR HAVING LITTLE ENERGY: 0
3. TROUBLE FALLING OR STAYING ASLEEP: 0
SUM OF ALL RESPONSES TO PHQ9 QUESTIONS 1 & 2: 0
5. POOR APPETITE OR OVEREATING: 0
SUM OF ALL RESPONSES TO PHQ QUESTIONS 1-9: 0
2. FEELING DOWN, DEPRESSED OR HOPELESS: 0
10. IF YOU CHECKED OFF ANY PROBLEMS, HOW DIFFICULT HAVE THESE PROBLEMS MADE IT FOR YOU TO DO YOUR WORK, TAKE CARE OF THINGS AT HOME, OR GET ALONG WITH OTHER PEOPLE: 0
8. MOVING OR SPEAKING SO SLOWLY THAT OTHER PEOPLE COULD HAVE NOTICED. OR THE OPPOSITE, BEING SO FIGETY OR RESTLESS THAT YOU HAVE BEEN MOVING AROUND A LOT MORE THAN USUAL: 0
SUM OF ALL RESPONSES TO PHQ QUESTIONS 1-9: 0
7. TROUBLE CONCENTRATING ON THINGS, SUCH AS READING THE NEWSPAPER OR WATCHING TELEVISION: 0
SUM OF ALL RESPONSES TO PHQ QUESTIONS 1-9: 0
9. THOUGHTS THAT YOU WOULD BE BETTER OFF DEAD, OR OF HURTING YOURSELF: 0

## 2022-10-26 ASSESSMENT — SOCIAL DETERMINANTS OF HEALTH (SDOH): HOW HARD IS IT FOR YOU TO PAY FOR THE VERY BASICS LIKE FOOD, HOUSING, MEDICAL CARE, AND HEATING?: NOT HARD AT ALL

## 2022-11-17 PROBLEM — K63.5 HYPERPLASTIC COLON POLYP: Status: ACTIVE | Noted: 2022-04-20

## 2022-11-21 DIAGNOSIS — I10 ESSENTIAL HYPERTENSION, BENIGN: ICD-10-CM

## 2022-11-21 LAB
ANION GAP SERPL CALCULATED.3IONS-SCNC: 16 MMOL/L (ref 3–16)
BACTERIA: NORMAL /HPF
BILIRUBIN URINE: NEGATIVE
BLOOD, URINE: NEGATIVE
BUN BLDV-MCNC: 14 MG/DL (ref 7–20)
CALCIUM SERPL-MCNC: 9.8 MG/DL (ref 8.3–10.6)
CHLORIDE BLD-SCNC: 102 MMOL/L (ref 99–110)
CLARITY: CLEAR
CO2: 23 MMOL/L (ref 21–32)
COLOR: YELLOW
CREAT SERPL-MCNC: 0.7 MG/DL (ref 0.6–1.1)
EPITHELIAL CELLS, UA: 0 /HPF (ref 0–5)
GFR SERPL CREATININE-BSD FRML MDRD: >60 ML/MIN/{1.73_M2}
GLUCOSE BLD-MCNC: 89 MG/DL (ref 70–99)
GLUCOSE URINE: NEGATIVE MG/DL
HYALINE CASTS: 0 /LPF (ref 0–8)
KETONES, URINE: NEGATIVE MG/DL
LEUKOCYTE ESTERASE, URINE: NEGATIVE
MICROSCOPIC EXAMINATION: NORMAL
NITRITE, URINE: NEGATIVE
PH UA: 6.5 (ref 5–8)
POTASSIUM SERPL-SCNC: 4.3 MMOL/L (ref 3.5–5.1)
PROTEIN UA: NEGATIVE MG/DL
RBC UA: 0 /HPF (ref 0–4)
SODIUM BLD-SCNC: 141 MMOL/L (ref 136–145)
SPECIFIC GRAVITY UA: 1.01 (ref 1–1.03)
URINE TYPE: NORMAL
UROBILINOGEN, URINE: 0.2 E.U./DL
WBC UA: 0 /HPF (ref 0–5)

## 2022-12-23 ENCOUNTER — TELEMEDICINE (OUTPATIENT)
Dept: FAMILY MEDICINE CLINIC | Age: 47
End: 2022-12-23
Payer: COMMERCIAL

## 2022-12-23 DIAGNOSIS — J45.31 MILD PERSISTENT ASTHMA WITH ACUTE EXACERBATION: ICD-10-CM

## 2022-12-23 DIAGNOSIS — I10 ESSENTIAL HYPERTENSION, BENIGN: ICD-10-CM

## 2022-12-23 DIAGNOSIS — J20.9 ACUTE BRONCHITIS, UNSPECIFIED ORGANISM: Primary | ICD-10-CM

## 2022-12-23 PROCEDURE — 99213 OFFICE O/P EST LOW 20 MIN: CPT | Performed by: FAMILY MEDICINE

## 2022-12-23 RX ORDER — LISINOPRIL 10 MG/1
10 TABLET ORAL DAILY
Qty: 90 TABLET | Refills: 1 | Status: SHIPPED | OUTPATIENT
Start: 2022-12-23

## 2022-12-23 RX ORDER — AMOXICILLIN AND CLAVULANATE POTASSIUM 875; 125 MG/1; MG/1
1 TABLET, FILM COATED ORAL 2 TIMES DAILY
Qty: 20 TABLET | Refills: 0 | Status: SHIPPED | OUTPATIENT
Start: 2022-12-23 | End: 2023-01-02

## 2022-12-23 RX ORDER — OMEPRAZOLE 20 MG/1
20 CAPSULE, DELAYED RELEASE ORAL 2 TIMES DAILY
Qty: 180 CAPSULE | Refills: 1 | Status: SHIPPED | OUTPATIENT
Start: 2022-12-23

## 2022-12-23 NOTE — PROGRESS NOTES
2022    TELEHEALTH EVALUATION -- Audio/Visual (During [facility] public health emergency)    HPI:    Nayeli Camarillo (:  1975) has requested an audio/video evaluation for the following concern(s):    Chest congestion    Developed a cold on 12/10. On 12/15/22 developed a fever. Tests neg for COVID, strep and flu. Was treated with ZPAK. Finished zpak on Tuesday. Still had ear pain and productive cough - yellow and green mucous. Took prednisone 25 mg x 2 days and 20 mg today. No wheezing, has not needed albuterol. Fever only lasted 3 days. Chest is congestion; + fatigue and malaise. Nasal discharged is clear. No nausea, vomiting, diarrhea. Ears are still full; pain has resolved. Rx: Mucinex,     Lab Results   Component Value Date/Time     2022 02:28 PM    K 4.3 2022 02:28 PM     2022 02:28 PM    CO2 23 2022 02:28 PM    BUN 14 2022 02:28 PM    CREATININE 0.7 2022 02:28 PM    GLUCOSE 89 2022 02:28 PM    CALCIUM 9.8 2022 02:28 PM         Review of Systems    Outpatient Medications Marked as Taking for the 22 encounter (Telemedicine) with Thuy Malik MD   Medication Sig Dispense Refill    lisinopril (PRINIVIL;ZESTRIL) 10 MG tablet Take 1 tablet by mouth daily 30 tablet 2    omeprazole (PRILOSEC) 20 MG delayed release capsule TAKE 1 CAPSULE BY MOUTH DAILY (Patient taking differently: Take 20 mg by mouth in the morning and at bedtime) 90 capsule 1    Budesonide-Formoterol Fumarate (SYMBICORT IN) Inhale into the lungs      Gluc-Chonn-MSM-Boswellia-Vit D (GLUCOSAMINE CHOND TRIPLE/VIT D PO) Take 1 tablet by mouth daily      fluticasone (FLONASE) 50 MCG/ACT nasal spray 1 spray by Nasal route daily 3 Bottle 3        Social History     Tobacco Use    Smoking status: Never    Smokeless tobacco: Never   Vaping Use    Vaping Use: Never used   Substance Use Topics    Alcohol use: Yes     Comment: less than one a week    Drug use:  No Allergies   Allergen Reactions    Dexamethasone Acetate Other (See Comments)     CONFUSION   ,   Past Medical History:   Diagnosis Date    Asthma     Esophageal web 09/2019    PVC (premature ventricular contraction)        PHYSICAL EXAMINATION:  [ INSTRUCTIONS:  \"[x]\" Indicates a positive item  \"[]\" Indicates a negative item  -- DELETE ALL ITEMS NOT EXAMINED]  Vital Signs: (As obtained by patient/caregiver or practitioner observation)    Blood pressure- 130/76 Heart rate-    Respiratory rate-    Temperature-  Pulse oximetry-   Wt 138 lb   Wt Readings from Last 3 Encounters:   10/26/22 138 lb 9.6 oz (62.9 kg)   02/18/22 135 lb 9.6 oz (61.5 kg)   02/08/21 141 lb (64 kg)     Temp Readings from Last 3 Encounters:   10/26/22 97.9 °F (36.6 °C) (Temporal)   02/18/22 97.1 °F (36.2 °C) (Temporal)   02/08/21 97.8 °F (36.6 °C) (Temporal)     BP Readings from Last 3 Encounters:   10/26/22 (!) 162/98   02/18/22 (!) 134/96   02/08/21 132/86     Pulse Readings from Last 3 Encounters:   10/26/22 63   02/18/22 91   02/08/21 61        Constitutional: [x] Appears well-developed and well-nourished [x] No apparent distress      [] Abnormal-   Mental status  [x] Alert and awake  [x] Oriented to person/place/time [x]Able to follow commands      Eyes:  EOM    [x]  Normal  [] Abnormal-  Sclera  [x]  Normal  [] Abnormal -         Discharge [x]  None visible  [] Abnormal -    HENT:   [x] Normocephalic, atraumatic.   [] Abnormal   [x] Mouth/Throat: Mucous membranes are moist.     External Ears [x] Normal  [] Abnormal-     Neck: [x] No visualized mass     Pulmonary/Chest: [x] Respiratory effort normal.  [x] No visualized signs of difficulty breathing or respiratory distress        [] Abnormal-          Neurological:        [x] No Facial Asymmetry (Cranial nerve 7 motor function) (limited exam to video visit)             Skin:        [x] No significant exanthematous lesions or discoloration noted on facial skin         [] Abnormal- Psychiatric:       [x] Normal Affect [x] No Hallucinations        [] Abnormal-     Other pertinent observable physical exam findings-     ASSESSMENT/PLAN:  1. Acute bronchitis, unspecified organism  Side effects of current medications reviewed and questions answered. Call or return to clinic prn if these symptoms worsen or fail to improve as anticipated. - amoxicillin-clavulanate (AUGMENTIN) 875-125 MG per tablet; Take 1 tablet by mouth 2 times daily for 10 days  Dispense: 20 tablet; Refill: 0    2. Mild persistent asthma with acute exacerbation  Continue steroid taper    3. Essential hypertension, benign  Well controlled  - lisinopril (PRINIVIL;ZESTRIL) 10 MG tablet; Take 1 tablet by mouth daily  Dispense: 90 tablet; Refill: 1      Side effects of current medications reviewed and questions answered. Call or return to clinic prn if these symptoms worsen or fail to improve as anticipated. No follow-ups on file. Annalee Jer, was evaluated through a synchronous (real-time) audio-video encounter. The patient (or guardian if applicable) is aware that this is a billable service, which includes applicable co-pays. This Virtual Visit was conducted with patient's (and/or legal guardian's) consent. The visit was conducted pursuant to the emergency declaration under the [address] [place] [address] authority and the [employer] and [employer] Act. Patient identification was verified, and a caregiver was present when appropriate. The patient was located at Home: [address]. Provider was located at Home ([address]: [place]. Total time spent on this encounter: Not billed by time    --Wilhemenia Hodgkins, MD on 12/23/2022 at 11:25 AM    An electronic signature was used to authenticate this note.

## 2023-03-01 LAB
ALBUMIN SERPL-MCNC: 4.2 G/DL (ref 3.5–5.7)
ALP BLD-CCNC: 49 IU/L (ref 35–135)
ALT SERPL-CCNC: 20 IU/L (ref 10–60)
ANION GAP SERPL CALCULATED.3IONS-SCNC: 6 MMOL/L (ref 6–18)
AST SERPL-CCNC: 24 IU/L (ref 10–40)
BILIRUB SERPL-MCNC: 0.6 MG/DL (ref 0–1.2)
BUN BLDV-MCNC: 13 MG/DL (ref 8–26)
CALCIUM SERPL-MCNC: 9.5 MG/DL (ref 8.5–10.4)
CHLORIDE BLD-SCNC: 106 MEQ/L (ref 98–111)
CHOLESTEROL, TOTAL: 198 MG/DL
CO2: 27 MMOL/L (ref 21–31)
CREAT SERPL-MCNC: 0.77 MG/DL (ref 0.6–1.2)
EGFR (CKD-EPI): 94 ML/MIN/1.73 M2
GLUCOSE BLD-MCNC: 100 MG/DL (ref 70–99)
HCT VFR BLD CALC: 41.4 % (ref 36–46)
HDLC SERPL-MCNC: 72 MG/DL
HEMOGLOBIN: 13.6 G/DL (ref 12–15.2)
LDL CHOLESTEROL CALCULATED: 117 MG/DL
MCH RBC QN AUTO: 29.9 PG (ref 27–33)
MCHC RBC AUTO-ENTMCNC: 32.9 G/DL (ref 32–36)
MCV RBC AUTO: 90.7 FL (ref 82–97)
NONHDLC SERPL-MCNC: 126 MG/DL
PDW BLD-RTO: 14 % (ref 12.3–17)
PLATELET # BLD: 261 THOU/MCL (ref 140–375)
PMV BLD AUTO: 8 FL (ref 7.4–11.5)
POTASSIUM SERPL-SCNC: 4 MEQ/L (ref 3.6–5.1)
RBC # BLD: 4.57 MIL/MCL (ref 3.8–5.2)
SODIUM BLD-SCNC: 139 MEQ/L (ref 135–145)
TOTAL PROTEIN: 6.7 G/DL (ref 6–8)
TRIGL SERPL-MCNC: 47 MG/DL
TSH ULTRASENSITIVE: 1.17 MCIU/ML (ref 0.27–4.2)
WBC # BLD: 5.5 THOU/MCL (ref 3.6–10.5)

## 2023-03-06 NOTE — PROGRESS NOTES
Subjective:      Patient ID: Bertram Hudson is a 50 y.o. female is here for her annual wellness exam.     HPI    PAP + HPV negative 2/4/20. Had ablation for heavy menses. Has just some spotting since then. No hot flashes or night sweats. Mammogram: normal 9/2022. FH maternal cousin breast cancer age 36  Colonoscopy: 4/21/22.  + serrated poly.   Due 2025  Vaccines: up-to-date  Diet:  eats well   Exercises daily, bikes, Pilates   Sleeps well       The 10-year ASCVD risk score (Justin KRAMER, et al., 2019) is: 1.7%    Values used to calculate the score:      Age: 50 years      Sex: Female      Is Non- : No      Diabetic: No      Tobacco smoker: No      Systolic Blood Pressure: 624 mmHg      Is BP treated: Yes      HDL Cholesterol: 72 mg/dL      Total Cholesterol: 198 mg/dL         Health Maintenance   Topic Date Due    Depression Monitoring  10/26/2023    DTaP/Tdap/Td vaccine (2 - Td or Tdap) 08/06/2024    Cervical cancer screen  02/04/2025    Colorectal Cancer Screen  04/20/2027    Lipids  03/01/2028    Flu vaccine  Completed    Pneumococcal 0-64 years Vaccine  Completed    COVID-19 Vaccine  Completed    Hepatitis C screen  Completed    HIV screen  Completed    Hepatitis A vaccine  Aged Out    Hib vaccine  Aged Out    Meningococcal (ACWY) vaccine  Aged Out           Outpatient Medications Marked as Taking for the 3/7/23 encounter (Office Visit) with Radha Juarez MD   Medication Sig Dispense Refill    lisinopril (PRINIVIL;ZESTRIL) 10 MG tablet Take 1 tablet by mouth daily 90 tablet 1    omeprazole (PRILOSEC) 20 MG delayed release capsule Take 1 capsule by mouth in the morning and at bedtime 180 capsule 1    Budesonide-Formoterol Fumarate (SYMBICORT IN) Inhale into the lungs      diclofenac sodium (VOLTAREN) 1 % GEL Apply 2 g topically 4 times daily 3 Tube 0    Gluc-Chonn-MSM-Boswellia-Vit D (GLUCOSAMINE CHOND TRIPLE/VIT D PO) Take 1 tablet by mouth daily      fluticasone (FLONASE) 50 MCG/ACT nasal spray 1 spray by Nasal route daily 3 Bottle 3       Allergies   Allergen Reactions    Dexamethasone Acetate Other (See Comments)     CONFUSION       Patient Active Problem List   Diagnosis    Acne    Asthma    Allergic rhinitis    PMDD (premenstrual dysphoric disorder)    Acne rosacea    Eosinophilic esophagitis    Esophageal web    Hyperplastic colon polyp        Past Medical History:   Diagnosis Date    Asthma     Esophageal web 09/2019    PVC (premature ventricular contraction)        Past Surgical History:   Procedure Laterality Date    BREAST RECONSTRUCTION      reduction 1997    UPPER GASTROINTESTINAL ENDOSCOPY N/A 11/1/2019    EGD WITH SAVORY  DILATION 7,9,11,12,AND 14 UNDER FLUORO WITH PEDIATRIC SCOPE, MAC ANESTHESIA performed by Gladys Gotti MD at [address] [place] N/A 11/1/2019    EGD BIOPSY ESOPHAGUS FOR EOE performed by Gladys Gotti MD at [address] N/A 12/6/2019    EGD DILATION BALLOON performed by Gladys Gotti MD at [address] N/A 12/6/2019    EGD BIOPSY performed by Gladys Gotti MD at [address] History     Tobacco Use    Smoking status: Never    Smokeless tobacco: Never   Vaping Use    Vaping Use: Never used   Substance Use Topics    Alcohol use: Yes     Comment: less than one a week    Drug use: No       Family History   Problem Relation Age of Onset    Hypertension Mother     Hypertension Father     Coronary Art Dis Maternal Grandmother 72    Coronary Art Dis Maternal Uncle     Breast Cancer Maternal Cousin 40       Review of Systems  Review of Systems   Constitutional:  Negative for activity change, appetite change, fatigue and unexpected weight change. HENT:  Positive for hearing loss and tinnitus. Negative for congestion, nosebleeds, sore throat, trouble swallowing and voice change.          Mild tinnitus and hearing loss.  Stable. Uses ear protection. Eyes:  Negative for visual disturbance. Respiratory:  Negative for cough, choking, chest tightness, shortness of breath and wheezing. Cardiovascular:  Negative for chest pain, palpitations and leg swelling. Gastrointestinal:  Negative for abdominal pain, anal bleeding, blood in stool, constipation, diarrhea and nausea. Genitourinary:  Negative for dysuria, flank pain, frequency, hematuria, pelvic pain, urgency, vaginal bleeding and vaginal discharge. Musculoskeletal:  Positive for arthralgias. Negative for back pain and myalgias. Knee pain, chronic and manageable. Skin:  Negative for color change and rash. Allergic/Immunologic: Negative for environmental allergies. Neurological:  Negative for weakness, light-headedness and headaches. Hematological:  Does not bruise/bleed easily. Psychiatric/Behavioral:  Negative for dysphoric mood and sleep disturbance. The patient is not nervous/anxious.       Lab Results   Component Value Date     03/01/2023    K 4.0 03/01/2023     03/01/2023    CO2 27 03/01/2023    BUN 13 03/01/2023    CREATININE 0.77 03/01/2023    GLUCOSE 100 (H) 03/01/2023    CALCIUM 9.5 03/01/2023    PROT 6.7 03/01/2023    LABALBU 4.2 03/01/2023    BILITOT 0.6 03/01/2023    ALKPHOS 49 03/01/2023    AST 24 03/01/2023    ALT 20 03/01/2023    LABGLOM >60 11/21/2022    GFRAA >60 02/17/2022    AGRATIO 1.5 02/17/2022    GLOB 2.5 01/29/2021        Lab Results   Component Value Date    CHOL 198 03/01/2023    CHOL 195 02/17/2022    CHOL 193 01/29/2021     Lab Results   Component Value Date    TRIG 47 03/01/2023    TRIG 73 02/17/2022    TRIG 67 01/29/2021     Lab Results   Component Value Date    HDL 72 03/01/2023    HDL 66 (H) 02/17/2022    HDL 67 01/29/2021     Lab Results   Component Value Date    LDLCALC 117 03/01/2023    LDLCALC 114 (H) 02/17/2022    LDLCALC 114 (H) 01/29/2021     Lab Results   Component Value Date    LABVLDL 15 02/17/2022    LABVLDL 12 01/29/2021    LABVLDL 10 01/30/2020     Lab Results   Component Value Date    CHOLHDLRATIO 2.6 07/19/2013      TSH   Date Value Ref Range Status   03/01/2023 1.170 0.270 - 4.200 mcIU/mL Final     Comment:     (NOTE)  Ingestion of karen doses of biotin (>5 mg/day) taken within 8 hours of  drawing blood sample can interfere with this immunoassay test.     02/17/2022 0.67 0.27 - 4.20 uIU/mL Final   01/29/2021 0.822 0.450 - 4.500 uIU/mL Final   01/30/2020 0.73 0.27 - 4.20 uIU/mL Final   02/05/2019 1.15 0.27 - 4.20 uIU/mL Final   01/12/2018 1.130 0.450 - 4.500 uIU/mL Final   04/30/2014 1.02 uIU/mL Final     Lab Results   Component Value Date    WBC 5.5 03/01/2023    HGB 13.6 03/01/2023    HCT 41.4 03/01/2023    MCV 90.7 03/01/2023     03/01/2023      Hemoglobin A1C   Date Value Ref Range Status   02/18/2022 5.3 % Final        Lab Results   Component Value Date    WBC 5.5 03/01/2023    HGB 13.6 03/01/2023    HCT 41.4 03/01/2023    MCV 90.7 03/01/2023     03/01/2023      Objective:   Physical Exam  .  Vitals:    03/07/23 1445   BP: 104/64   Pulse: 74   Resp: 14   Temp: 97.9 °F (36.6 °C)   SpO2: 99%     Wt Readings from Last 3 Encounters:   03/07/23 137 lb 6.4 oz (62.3 kg)   10/26/22 138 lb 9.6 oz (62.9 kg)   02/18/22 135 lb 9.6 oz (61.5 kg)        Physical Exam  Constitutional:       Appearance: Normal appearance. She is well-developed. HENT:      Head: Normocephalic and atraumatic. Right Ear: Hearing, tympanic membrane, ear canal and external ear normal.      Left Ear: Hearing, tympanic membrane, ear canal and external ear normal.      Nose: Nose normal.      Mouth/Throat:      Pharynx: Uvula midline. Eyes:      General: Lids are normal. No scleral icterus. Conjunctiva/sclera: Conjunctivae normal.      Pupils: Pupils are equal, round, and reactive to light. Funduscopic exam:     Right eye: No hemorrhage, exudate or arteriolar narrowing.          Left eye: No hemorrhage, exudate or arteriolar narrowing. Neck:      Thyroid: No thyroid mass or thyromegaly. Vascular: No carotid bruit or JVD. Trachea: Trachea normal.   Cardiovascular:      Rate and Rhythm: Normal rate and regular rhythm. Pulses:           Carotid pulses are 2+ on the right side and 2+ on the left side. Dorsalis pedis pulses are 2+ on the right side and 2+ on the left side. Posterior tibial pulses are 2+ on the right side and 2+ on the left side. Heart sounds: S1 normal and S2 normal. No murmur heard. No gallop. Pulmonary:      Effort: Pulmonary effort is normal.      Breath sounds: Normal breath sounds. Chest:      Chest wall: No tenderness. Breasts:     Breasts are symmetrical.      Right: No inverted nipple, mass, nipple discharge, skin change or tenderness. Left: No inverted nipple, mass, nipple discharge, skin change or tenderness. Abdominal:      General: Bowel sounds are normal.      Tenderness: There is no abdominal tenderness. Hernia: No hernia is present. There is no hernia in the left inguinal area. Genitourinary:     Labia:         Right: No rash, tenderness, lesion or injury. Left: No rash, tenderness, lesion or injury. Vagina: Normal.      Cervix: No cervical motion tenderness, discharge or friability. Adnexa:         Right: No mass, tenderness or fullness. Left: No mass, tenderness or fullness. Musculoskeletal:         General: Normal range of motion. Cervical back: Neck supple. Lymphadenopathy:      Head:      Right side of head: No submental or submandibular adenopathy. Left side of head: No submental or submandibular adenopathy. Cervical: No cervical adenopathy. Upper Body:      Right upper body: No supraclavicular adenopathy. Left upper body: No supraclavicular adenopathy. Skin:     General: Skin is warm and dry. Findings: No bruising, lesion or rash. Nails:  There is no clubbing. Neurological:      Mental Status: She is alert. Cranial Nerves: No cranial nerve deficit. Deep Tendon Reflexes:      Reflex Scores:       Patellar reflexes are 2+ on the right side and 2+ on the left side. Psychiatric:         Speech: Speech normal.         Behavior: Behavior normal.         Thought Content: Thought content normal.         Judgment: Judgment normal.         Assessment and Plan       Diagnosis Orders   1. Well adult exam  Discussed diet, exercise   Calcium and Vitamin D addressed  PAP +  HPV done. Repeat 5 years if negative. Annual to biannual mammogram  Annual influenza vaccine  Dt every 10 years  Colonoscopy every 3 years, due 2025  Annual COVID booster      2. Essential hypertension, benign  lisinopril (PRINIVIL;ZESTRIL) 10 MG tablet  Well controlled      3. Cervical smear, as part of routine gynecological examination  PAP SMEAR          Side effects of current medications reviewed and questions answered. Follow up in 1 year or prn.

## 2023-03-07 ENCOUNTER — OFFICE VISIT (OUTPATIENT)
Dept: FAMILY MEDICINE CLINIC | Age: 48
End: 2023-03-07

## 2023-03-07 VITALS
SYSTOLIC BLOOD PRESSURE: 104 MMHG | RESPIRATION RATE: 14 BRPM | OXYGEN SATURATION: 99 % | WEIGHT: 137.4 LBS | BODY MASS INDEX: 22.89 KG/M2 | HEIGHT: 65 IN | TEMPERATURE: 97.9 F | HEART RATE: 74 BPM | DIASTOLIC BLOOD PRESSURE: 64 MMHG

## 2023-03-07 DIAGNOSIS — Z01.419 CERVICAL SMEAR, AS PART OF ROUTINE GYNECOLOGICAL EXAMINATION: ICD-10-CM

## 2023-03-07 DIAGNOSIS — Z00.00 WELL ADULT EXAM: Primary | ICD-10-CM

## 2023-03-07 DIAGNOSIS — I10 ESSENTIAL HYPERTENSION, BENIGN: ICD-10-CM

## 2023-03-07 RX ORDER — LISINOPRIL 10 MG/1
10 TABLET ORAL DAILY
Qty: 90 TABLET | Refills: 3 | Status: SHIPPED | OUTPATIENT
Start: 2023-03-07

## 2023-03-07 RX ORDER — OMEPRAZOLE 20 MG/1
20 CAPSULE, DELAYED RELEASE ORAL 2 TIMES DAILY
Qty: 180 CAPSULE | Refills: 1 | Status: CANCELLED | OUTPATIENT
Start: 2023-03-07

## 2023-03-07 RX ORDER — OMEPRAZOLE 20 MG/1
20 CAPSULE, DELAYED RELEASE ORAL 2 TIMES DAILY
Qty: 180 CAPSULE | Refills: 3 | Status: SHIPPED | OUTPATIENT
Start: 2023-03-07

## 2023-03-07 SDOH — ECONOMIC STABILITY: HOUSING INSECURITY
IN THE LAST 12 MONTHS, WAS THERE A TIME WHEN YOU DID NOT HAVE A STEADY PLACE TO SLEEP OR SLEPT IN A SHELTER (INCLUDING NOW)?: NO

## 2023-03-07 SDOH — ECONOMIC STABILITY: FOOD INSECURITY: WITHIN THE PAST 12 MONTHS, YOU WORRIED THAT YOUR FOOD WOULD RUN OUT BEFORE YOU GOT MONEY TO BUY MORE.: NEVER TRUE

## 2023-03-07 SDOH — ECONOMIC STABILITY: INCOME INSECURITY: HOW HARD IS IT FOR YOU TO PAY FOR THE VERY BASICS LIKE FOOD, HOUSING, MEDICAL CARE, AND HEATING?: NOT HARD AT ALL

## 2023-03-07 SDOH — ECONOMIC STABILITY: FOOD INSECURITY: WITHIN THE PAST 12 MONTHS, THE FOOD YOU BOUGHT JUST DIDN'T LAST AND YOU DIDN'T HAVE MONEY TO GET MORE.: NEVER TRUE

## 2023-03-07 ASSESSMENT — PATIENT HEALTH QUESTIONNAIRE - PHQ9
4. FEELING TIRED OR HAVING LITTLE ENERGY: 0
3. TROUBLE FALLING OR STAYING ASLEEP: 0
2. FEELING DOWN, DEPRESSED OR HOPELESS: 0
1. LITTLE INTEREST OR PLEASURE IN DOING THINGS: 0
SUM OF ALL RESPONSES TO PHQ9 QUESTIONS 1 & 2: 0
SUM OF ALL RESPONSES TO PHQ QUESTIONS 1-9: 0
8. MOVING OR SPEAKING SO SLOWLY THAT OTHER PEOPLE COULD HAVE NOTICED. OR THE OPPOSITE, BEING SO FIGETY OR RESTLESS THAT YOU HAVE BEEN MOVING AROUND A LOT MORE THAN USUAL: 0
10. IF YOU CHECKED OFF ANY PROBLEMS, HOW DIFFICULT HAVE THESE PROBLEMS MADE IT FOR YOU TO DO YOUR WORK, TAKE CARE OF THINGS AT HOME, OR GET ALONG WITH OTHER PEOPLE: 0
SUM OF ALL RESPONSES TO PHQ QUESTIONS 1-9: 0
7. TROUBLE CONCENTRATING ON THINGS, SUCH AS READING THE NEWSPAPER OR WATCHING TELEVISION: 0
6. FEELING BAD ABOUT YOURSELF - OR THAT YOU ARE A FAILURE OR HAVE LET YOURSELF OR YOUR FAMILY DOWN: 0
SUM OF ALL RESPONSES TO PHQ QUESTIONS 1-9: 0
5. POOR APPETITE OR OVEREATING: 0
SUM OF ALL RESPONSES TO PHQ QUESTIONS 1-9: 0
9. THOUGHTS THAT YOU WOULD BE BETTER OFF DEAD, OR OF HURTING YOURSELF: 0

## 2023-03-07 ASSESSMENT — ENCOUNTER SYMPTOMS
COLOR CHANGE: 0
TROUBLE SWALLOWING: 0
ABDOMINAL PAIN: 0
CHOKING: 0
CHEST TIGHTNESS: 0
COUGH: 0
SHORTNESS OF BREATH: 0
BLOOD IN STOOL: 0
BACK PAIN: 0
DIARRHEA: 0
NAUSEA: 0
WHEEZING: 0
SORE THROAT: 0
CONSTIPATION: 0
ANAL BLEEDING: 0
VOICE CHANGE: 0

## 2023-03-15 ENCOUNTER — PATIENT MESSAGE (OUTPATIENT)
Dept: FAMILY MEDICINE CLINIC | Age: 48
End: 2023-03-15

## 2023-03-15 NOTE — TELEPHONE ENCOUNTER
From: Comfort Dominguez  To: Dr. Tony Hidalgo: 3/15/2023 10:28 AM EDT  Subject: D[place]    I have a cold with a cough from drainage that is keeping me up the last two nights. Im taking a decongestant. In the past Tessalon Perles helped a lot so I was wondering if I can get a prescription? Also Im taking a red-eye to and from [place] at the end of the month and wondering if you recommend a sleep-aid? Thanks!    Woo Cárdenas

## 2023-03-16 RX ORDER — BENZONATATE 200 MG/1
200 CAPSULE ORAL 3 TIMES DAILY PRN
Qty: 21 CAPSULE | Refills: 0 | Status: SHIPPED | OUTPATIENT
Start: 2023-03-16 | End: 2023-03-23

## 2024-03-25 RX ORDER — OMEPRAZOLE 20 MG/1
CAPSULE, DELAYED RELEASE ORAL
Qty: 180 CAPSULE | Refills: 3 | OUTPATIENT
Start: 2024-03-25

## (undated) DEVICE — CATHETER DIL 6FR L180CM BLLN INFLATED 36-40.5-45FR L8CM ES

## (undated) DEVICE — ESOPHAGEAL BALLOON DILATATION CATHETER: Brand: CRE FIXED WIRE

## (undated) DEVICE — FORCEPS BX L240CM DIA2.4MM L NDL RAD JAW 4 133340